# Patient Record
Sex: MALE | Race: WHITE | NOT HISPANIC OR LATINO
[De-identification: names, ages, dates, MRNs, and addresses within clinical notes are randomized per-mention and may not be internally consistent; named-entity substitution may affect disease eponyms.]

---

## 2017-01-11 ENCOUNTER — APPOINTMENT (OUTPATIENT)
Dept: CARDIOLOGY | Facility: CLINIC | Age: 72
End: 2017-01-11

## 2017-01-11 VITALS
HEIGHT: 70 IN | HEART RATE: 64 BPM | SYSTOLIC BLOOD PRESSURE: 138 MMHG | WEIGHT: 238 LBS | BODY MASS INDEX: 34.07 KG/M2 | DIASTOLIC BLOOD PRESSURE: 74 MMHG

## 2017-01-11 DIAGNOSIS — Z87.891 PERSONAL HISTORY OF NICOTINE DEPENDENCE: ICD-10-CM

## 2017-01-23 ENCOUNTER — INPATIENT (INPATIENT)
Facility: HOSPITAL | Age: 72
LOS: 5 days | Discharge: HOME | End: 2017-01-29
Attending: INTERNAL MEDICINE

## 2017-05-15 ENCOUNTER — APPOINTMENT (OUTPATIENT)
Dept: CARDIOLOGY | Facility: CLINIC | Age: 72
End: 2017-05-15

## 2017-05-15 VITALS
WEIGHT: 224 LBS | BODY MASS INDEX: 32.07 KG/M2 | HEART RATE: 57 BPM | DIASTOLIC BLOOD PRESSURE: 80 MMHG | SYSTOLIC BLOOD PRESSURE: 124 MMHG | HEIGHT: 70 IN

## 2017-06-27 DIAGNOSIS — K26.9 DUODENAL ULCER, UNSPECIFIED AS ACUTE OR CHRONIC, WITHOUT HEMORRHAGE OR PERFORATION: ICD-10-CM

## 2017-06-27 DIAGNOSIS — Z79.84 LONG TERM (CURRENT) USE OF ORAL HYPOGLYCEMIC DRUGS: ICD-10-CM

## 2017-06-27 DIAGNOSIS — E11.9 TYPE 2 DIABETES MELLITUS WITHOUT COMPLICATIONS: ICD-10-CM

## 2017-06-27 DIAGNOSIS — Z79.82 LONG TERM (CURRENT) USE OF ASPIRIN: ICD-10-CM

## 2017-06-27 DIAGNOSIS — E78.5 HYPERLIPIDEMIA, UNSPECIFIED: ICD-10-CM

## 2017-06-27 DIAGNOSIS — K57.10 DIVERTICULOSIS OF SMALL INTESTINE WITHOUT PERFORATION OR ABSCESS WITHOUT BLEEDING: ICD-10-CM

## 2017-06-27 DIAGNOSIS — I25.10 ATHEROSCLEROTIC HEART DISEASE OF NATIVE CORONARY ARTERY WITHOUT ANGINA PECTORIS: ICD-10-CM

## 2017-06-27 DIAGNOSIS — I10 ESSENTIAL (PRIMARY) HYPERTENSION: ICD-10-CM

## 2017-06-27 DIAGNOSIS — R17 UNSPECIFIED JAUNDICE: ICD-10-CM

## 2017-06-27 DIAGNOSIS — R74.0 NONSPECIFIC ELEVATION OF LEVELS OF TRANSAMINASE AND LACTIC ACID DEHYDROGENASE [LDH]: ICD-10-CM

## 2017-06-27 DIAGNOSIS — Z82.49 FAMILY HISTORY OF ISCHEMIC HEART DISEASE AND OTHER DISEASES OF THE CIRCULATORY SYSTEM: ICD-10-CM

## 2017-06-27 DIAGNOSIS — Z87.891 PERSONAL HISTORY OF NICOTINE DEPENDENCE: ICD-10-CM

## 2017-06-27 DIAGNOSIS — Z95.5 PRESENCE OF CORONARY ANGIOPLASTY IMPLANT AND GRAFT: ICD-10-CM

## 2017-08-25 ENCOUNTER — INPATIENT (INPATIENT)
Facility: HOSPITAL | Age: 72
LOS: 4 days | Discharge: HOME | End: 2017-08-30
Attending: INTERNAL MEDICINE

## 2017-08-25 DIAGNOSIS — R10.9 UNSPECIFIED ABDOMINAL PAIN: ICD-10-CM

## 2017-08-25 DIAGNOSIS — K26.3 ACUTE DUODENAL ULCER WITHOUT HEMORRHAGE OR PERFORATION: ICD-10-CM

## 2017-09-01 DIAGNOSIS — Z79.84 LONG TERM (CURRENT) USE OF ORAL HYPOGLYCEMIC DRUGS: ICD-10-CM

## 2017-09-01 DIAGNOSIS — Z95.5 PRESENCE OF CORONARY ANGIOPLASTY IMPLANT AND GRAFT: ICD-10-CM

## 2017-09-01 DIAGNOSIS — K80.51 CALCULUS OF BILE DUCT WITHOUT CHOLANGITIS OR CHOLECYSTITIS WITH OBSTRUCTION: ICD-10-CM

## 2017-09-01 DIAGNOSIS — K57.10 DIVERTICULOSIS OF SMALL INTESTINE WITHOUT PERFORATION OR ABSCESS WITHOUT BLEEDING: ICD-10-CM

## 2017-09-01 DIAGNOSIS — E11.9 TYPE 2 DIABETES MELLITUS WITHOUT COMPLICATIONS: ICD-10-CM

## 2017-09-01 DIAGNOSIS — R17 UNSPECIFIED JAUNDICE: ICD-10-CM

## 2017-09-01 DIAGNOSIS — I10 ESSENTIAL (PRIMARY) HYPERTENSION: ICD-10-CM

## 2017-09-01 DIAGNOSIS — I25.10 ATHEROSCLEROTIC HEART DISEASE OF NATIVE CORONARY ARTERY WITHOUT ANGINA PECTORIS: ICD-10-CM

## 2017-09-01 DIAGNOSIS — D64.9 ANEMIA, UNSPECIFIED: ICD-10-CM

## 2017-09-06 ENCOUNTER — APPOINTMENT (OUTPATIENT)
Dept: SURGERY | Facility: CLINIC | Age: 72
End: 2017-09-06
Payer: MEDICARE

## 2017-09-06 VITALS
SYSTOLIC BLOOD PRESSURE: 120 MMHG | DIASTOLIC BLOOD PRESSURE: 70 MMHG | BODY MASS INDEX: 31.21 KG/M2 | WEIGHT: 218 LBS | HEIGHT: 70 IN

## 2017-09-06 DIAGNOSIS — K80.40 CALCULUS OF BILE DUCT WITH CHOLECYSTITIS, UNSPECIFIED, W/OUT OBSTRUCTION: ICD-10-CM

## 2017-09-06 PROCEDURE — 99213 OFFICE O/P EST LOW 20 MIN: CPT

## 2017-09-13 ENCOUNTER — MOBILE ON CALL (OUTPATIENT)
Age: 72
End: 2017-09-13

## 2017-09-18 ENCOUNTER — OUTPATIENT (OUTPATIENT)
Dept: OUTPATIENT SERVICES | Facility: HOSPITAL | Age: 72
LOS: 1 days | Discharge: HOME | End: 2017-09-18

## 2017-09-18 DIAGNOSIS — K80.40 CALCULUS OF BILE DUCT WITH CHOLECYSTITIS, UNSPECIFIED, WITHOUT OBSTRUCTION: ICD-10-CM

## 2017-09-18 DIAGNOSIS — Z01.818 ENCOUNTER FOR OTHER PREPROCEDURAL EXAMINATION: ICD-10-CM

## 2017-09-18 DIAGNOSIS — E66.9 OBESITY, UNSPECIFIED: ICD-10-CM

## 2017-09-18 DIAGNOSIS — K26.3 ACUTE DUODENAL ULCER WITHOUT HEMORRHAGE OR PERFORATION: ICD-10-CM

## 2017-09-18 DIAGNOSIS — E11.9 TYPE 2 DIABETES MELLITUS WITHOUT COMPLICATIONS: ICD-10-CM

## 2017-09-18 DIAGNOSIS — I10 ESSENTIAL (PRIMARY) HYPERTENSION: ICD-10-CM

## 2017-09-18 DIAGNOSIS — I25.10 ATHEROSCLEROTIC HEART DISEASE OF NATIVE CORONARY ARTERY WITHOUT ANGINA PECTORIS: ICD-10-CM

## 2017-09-18 DIAGNOSIS — R10.9 UNSPECIFIED ABDOMINAL PAIN: ICD-10-CM

## 2017-09-21 ENCOUNTER — APPOINTMENT (OUTPATIENT)
Dept: CARDIOLOGY | Facility: CLINIC | Age: 72
End: 2017-09-21

## 2017-09-21 VITALS
HEIGHT: 70 IN | WEIGHT: 213 LBS | SYSTOLIC BLOOD PRESSURE: 106 MMHG | DIASTOLIC BLOOD PRESSURE: 74 MMHG | BODY MASS INDEX: 30.49 KG/M2

## 2017-09-22 ENCOUNTER — OUTPATIENT (OUTPATIENT)
Dept: OUTPATIENT SERVICES | Facility: HOSPITAL | Age: 72
LOS: 1 days | Discharge: HOME | End: 2017-09-22

## 2017-09-22 DIAGNOSIS — R10.9 UNSPECIFIED ABDOMINAL PAIN: ICD-10-CM

## 2017-09-22 DIAGNOSIS — K26.3 ACUTE DUODENAL ULCER WITHOUT HEMORRHAGE OR PERFORATION: ICD-10-CM

## 2017-09-23 ENCOUNTER — MOBILE ON CALL (OUTPATIENT)
Age: 72
End: 2017-09-23

## 2017-09-26 ENCOUNTER — TRANSCRIPTION ENCOUNTER (OUTPATIENT)
Age: 72
End: 2017-09-26

## 2017-10-02 DIAGNOSIS — I10 ESSENTIAL (PRIMARY) HYPERTENSION: ICD-10-CM

## 2017-10-02 DIAGNOSIS — Z87.891 PERSONAL HISTORY OF NICOTINE DEPENDENCE: ICD-10-CM

## 2017-10-02 DIAGNOSIS — K80.20 CALCULUS OF GALLBLADDER WITHOUT CHOLECYSTITIS WITHOUT OBSTRUCTION: ICD-10-CM

## 2017-10-02 DIAGNOSIS — E78.00 PURE HYPERCHOLESTEROLEMIA, UNSPECIFIED: ICD-10-CM

## 2017-10-02 DIAGNOSIS — E11.9 TYPE 2 DIABETES MELLITUS WITHOUT COMPLICATIONS: ICD-10-CM

## 2017-10-02 DIAGNOSIS — K80.10 CALCULUS OF GALLBLADDER WITH CHRONIC CHOLECYSTITIS WITHOUT OBSTRUCTION: ICD-10-CM

## 2017-10-02 DIAGNOSIS — K82.8 OTHER SPECIFIED DISEASES OF GALLBLADDER: ICD-10-CM

## 2017-10-04 ENCOUNTER — APPOINTMENT (OUTPATIENT)
Dept: SURGERY | Facility: CLINIC | Age: 72
End: 2017-10-04
Payer: MEDICARE

## 2017-10-04 VITALS
WEIGHT: 216 LBS | HEIGHT: 70 IN | DIASTOLIC BLOOD PRESSURE: 76 MMHG | SYSTOLIC BLOOD PRESSURE: 140 MMHG | BODY MASS INDEX: 30.92 KG/M2

## 2017-10-04 PROCEDURE — 99024 POSTOP FOLLOW-UP VISIT: CPT

## 2017-11-01 ENCOUNTER — APPOINTMENT (OUTPATIENT)
Dept: SURGERY | Facility: CLINIC | Age: 72
End: 2017-11-01
Payer: MEDICARE

## 2017-11-01 VITALS
SYSTOLIC BLOOD PRESSURE: 132 MMHG | WEIGHT: 220 LBS | BODY MASS INDEX: 31.5 KG/M2 | DIASTOLIC BLOOD PRESSURE: 78 MMHG | HEIGHT: 70 IN

## 2017-11-01 PROCEDURE — 99024 POSTOP FOLLOW-UP VISIT: CPT

## 2018-04-05 ENCOUNTER — APPOINTMENT (OUTPATIENT)
Dept: CARDIOLOGY | Facility: CLINIC | Age: 73
End: 2018-04-05

## 2018-04-05 VITALS
WEIGHT: 239 LBS | BODY MASS INDEX: 34.22 KG/M2 | HEIGHT: 70 IN | HEART RATE: 69 BPM | SYSTOLIC BLOOD PRESSURE: 132 MMHG | DIASTOLIC BLOOD PRESSURE: 78 MMHG

## 2018-10-10 ENCOUNTER — APPOINTMENT (OUTPATIENT)
Dept: CARDIOLOGY | Facility: CLINIC | Age: 73
End: 2018-10-10

## 2018-10-10 VITALS — HEIGHT: 70 IN | BODY MASS INDEX: 32.93 KG/M2 | WEIGHT: 230 LBS

## 2018-10-10 VITALS — SYSTOLIC BLOOD PRESSURE: 120 MMHG | DIASTOLIC BLOOD PRESSURE: 72 MMHG

## 2018-10-10 VITALS — HEART RATE: 65 BPM

## 2018-10-29 ENCOUNTER — FORM ENCOUNTER (OUTPATIENT)
Age: 73
End: 2018-10-29

## 2018-10-30 ENCOUNTER — OUTPATIENT (OUTPATIENT)
Dept: OUTPATIENT SERVICES | Facility: HOSPITAL | Age: 73
LOS: 1 days | Discharge: HOME | End: 2018-10-30

## 2018-10-30 VITALS
HEART RATE: 64 BPM | WEIGHT: 229.28 LBS | RESPIRATION RATE: 18 BRPM | DIASTOLIC BLOOD PRESSURE: 79 MMHG | HEIGHT: 70 IN | OXYGEN SATURATION: 97 % | TEMPERATURE: 98 F | SYSTOLIC BLOOD PRESSURE: 127 MMHG

## 2018-10-30 DIAGNOSIS — I25.10 ATHEROSCLEROTIC HEART DISEASE OF NATIVE CORONARY ARTERY WITHOUT ANGINA PECTORIS: Chronic | ICD-10-CM

## 2018-10-30 DIAGNOSIS — S42.92XS FRACTURE OF LEFT SHOULDER GIRDLE, PART UNSPECIFIED, SEQUELA: Chronic | ICD-10-CM

## 2018-10-30 DIAGNOSIS — Z01.818 ENCOUNTER FOR OTHER PREPROCEDURAL EXAMINATION: ICD-10-CM

## 2018-10-30 DIAGNOSIS — Z41.9 ENCOUNTER FOR PROCEDURE FOR PURPOSES OTHER THAN REMEDYING HEALTH STATE, UNSPECIFIED: Chronic | ICD-10-CM

## 2018-10-30 DIAGNOSIS — I25.10 ATHEROSCLEROTIC HEART DISEASE OF NATIVE CORONARY ARTERY WITHOUT ANGINA PECTORIS: ICD-10-CM

## 2018-10-30 LAB
ALBUMIN SERPL ELPH-MCNC: 4.4 G/DL — SIGNIFICANT CHANGE UP (ref 3.5–5.2)
ALP SERPL-CCNC: 88 U/L — SIGNIFICANT CHANGE UP (ref 30–115)
ALT FLD-CCNC: 15 U/L — SIGNIFICANT CHANGE UP (ref 0–41)
ANION GAP SERPL CALC-SCNC: 15 MMOL/L — HIGH (ref 7–14)
APTT BLD: 34.3 SEC — SIGNIFICANT CHANGE UP (ref 27–39.2)
AST SERPL-CCNC: 16 U/L — SIGNIFICANT CHANGE UP (ref 0–41)
BASOPHILS # BLD AUTO: 0.09 K/UL — SIGNIFICANT CHANGE UP (ref 0–0.2)
BASOPHILS NFR BLD AUTO: 1.1 % — HIGH (ref 0–1)
BILIRUB SERPL-MCNC: 1.2 MG/DL — SIGNIFICANT CHANGE UP (ref 0.2–1.2)
BUN SERPL-MCNC: 17 MG/DL — SIGNIFICANT CHANGE UP (ref 10–20)
CALCIUM SERPL-MCNC: 9.4 MG/DL — SIGNIFICANT CHANGE UP (ref 8.5–10.1)
CHLORIDE SERPL-SCNC: 98 MMOL/L — SIGNIFICANT CHANGE UP (ref 98–110)
CO2 SERPL-SCNC: 25 MMOL/L — SIGNIFICANT CHANGE UP (ref 17–32)
CREAT SERPL-MCNC: 1 MG/DL — SIGNIFICANT CHANGE UP (ref 0.7–1.5)
EOSINOPHIL # BLD AUTO: 0.42 K/UL — SIGNIFICANT CHANGE UP (ref 0–0.7)
EOSINOPHIL NFR BLD AUTO: 5.1 % — SIGNIFICANT CHANGE UP (ref 0–8)
GLUCOSE SERPL-MCNC: 109 MG/DL — HIGH (ref 70–99)
HCT VFR BLD CALC: 43.2 % — SIGNIFICANT CHANGE UP (ref 42–52)
HGB BLD-MCNC: 14.7 G/DL — SIGNIFICANT CHANGE UP (ref 14–18)
IMM GRANULOCYTES NFR BLD AUTO: 0.2 % — SIGNIFICANT CHANGE UP (ref 0.1–0.3)
INR BLD: 1.18 RATIO — SIGNIFICANT CHANGE UP (ref 0.65–1.3)
LYMPHOCYTES # BLD AUTO: 1.04 K/UL — LOW (ref 1.2–3.4)
LYMPHOCYTES # BLD AUTO: 12.6 % — LOW (ref 20.5–51.1)
MCHC RBC-ENTMCNC: 29.9 PG — SIGNIFICANT CHANGE UP (ref 27–31)
MCHC RBC-ENTMCNC: 34 G/DL — SIGNIFICANT CHANGE UP (ref 32–37)
MCV RBC AUTO: 87.8 FL — SIGNIFICANT CHANGE UP (ref 80–94)
MONOCYTES # BLD AUTO: 0.63 K/UL — HIGH (ref 0.1–0.6)
MONOCYTES NFR BLD AUTO: 7.6 % — SIGNIFICANT CHANGE UP (ref 1.7–9.3)
NEUTROPHILS # BLD AUTO: 6.07 K/UL — SIGNIFICANT CHANGE UP (ref 1.4–6.5)
NEUTROPHILS NFR BLD AUTO: 73.4 % — SIGNIFICANT CHANGE UP (ref 42.2–75.2)
NRBC # BLD: 0 /100 WBCS — SIGNIFICANT CHANGE UP (ref 0–0)
PLATELET # BLD AUTO: 245 K/UL — SIGNIFICANT CHANGE UP (ref 130–400)
POTASSIUM SERPL-MCNC: 4.6 MMOL/L — SIGNIFICANT CHANGE UP (ref 3.5–5)
POTASSIUM SERPL-SCNC: 4.6 MMOL/L — SIGNIFICANT CHANGE UP (ref 3.5–5)
PROT SERPL-MCNC: 7.8 G/DL — SIGNIFICANT CHANGE UP (ref 6–8)
PROTHROM AB SERPL-ACNC: 13.6 SEC — HIGH (ref 9.95–12.87)
RBC # BLD: 4.92 M/UL — SIGNIFICANT CHANGE UP (ref 4.7–6.1)
RBC # FLD: 12.4 % — SIGNIFICANT CHANGE UP (ref 11.5–14.5)
SODIUM SERPL-SCNC: 138 MMOL/L — SIGNIFICANT CHANGE UP (ref 135–146)
WBC # BLD: 8.27 K/UL — SIGNIFICANT CHANGE UP (ref 4.8–10.8)
WBC # FLD AUTO: 8.27 K/UL — SIGNIFICANT CHANGE UP (ref 4.8–10.8)

## 2018-10-30 NOTE — H&P PST ADULT - HISTORY OF PRESENT ILLNESS
Patient c/o chest pressure and ALFARO with out any associated symptoms x 3 wks. Patient denies any palpitations fever, cough or dysuria. Ex tolerance of 1  1/2 FOS with out SOB. No TRISTA.

## 2018-10-30 NOTE — H&P PST ADULT - REASON FOR ADMISSION
72 yo m presents to PAST for left heart catheretization under sedation on 11/6/2018 at cath lab by DR. Lee.

## 2018-10-30 NOTE — H&P PST ADULT - PMH
CAD (coronary artery disease)  PCI with 2 stents  DM (diabetes mellitus)    HTN (hypertension)    Hypercholesteremia

## 2018-10-30 NOTE — H&P PST ADULT - TEACHING/LEARNING FACTORS INFLUENCE READINESS TO LEARN
Pt given discharge instructions with understanding. Pt ambulatory out of ED steady gait in no distress    
none

## 2018-10-30 NOTE — H&P PST ADULT - PSH
Closed fracture of left shoulder, sequela  Dislocation  Coronary arteriosclerosis after percutaneous transluminal coronary angioplasty (PTCA)  2 Stents 07/1/2014  Elective surgery  Cholecystectomy 1 yr ago

## 2018-10-30 NOTE — H&P PST ADULT - NSANTHOSAYNRD_GEN_A_CORE
No. TRISTA screening performed.  STOP BANG Legend: 0-2 = LOW Risk; 3-4 = INTERMEDIATE Risk; 5-8 = HIGH Risk

## 2018-10-31 PROBLEM — I25.10 ATHEROSCLEROTIC HEART DISEASE OF NATIVE CORONARY ARTERY WITHOUT ANGINA PECTORIS: Chronic | Status: ACTIVE | Noted: 2018-10-30

## 2018-11-06 ENCOUNTER — OUTPATIENT (OUTPATIENT)
Dept: OUTPATIENT SERVICES | Facility: HOSPITAL | Age: 73
LOS: 1 days | Discharge: HOME | End: 2018-11-06

## 2018-11-06 DIAGNOSIS — S42.92XS FRACTURE OF LEFT SHOULDER GIRDLE, PART UNSPECIFIED, SEQUELA: Chronic | ICD-10-CM

## 2018-11-06 DIAGNOSIS — Z41.9 ENCOUNTER FOR PROCEDURE FOR PURPOSES OTHER THAN REMEDYING HEALTH STATE, UNSPECIFIED: Chronic | ICD-10-CM

## 2018-11-06 DIAGNOSIS — I25.10 ATHEROSCLEROTIC HEART DISEASE OF NATIVE CORONARY ARTERY WITHOUT ANGINA PECTORIS: Chronic | ICD-10-CM

## 2018-11-06 LAB — GLUCOSE BLDC GLUCOMTR-MCNC: 144 MG/DL — HIGH (ref 70–99)

## 2018-11-06 NOTE — PROGRESS NOTE ADULT - SUBJECTIVE AND OBJECTIVE BOX
POST OPERATIVE PROCEDURAL DOCUMENTATION   Preliminary Cardiac Catherization Post-Procedure Report:    PRE-OP DIAGNOSIS: cad, s/p pci of lcx, angina    PROCEDURE:     Left Heart Catheterization     LV Angiogram     Selective Coronary Angiogram    Primary Physician:  Adrián Jimenez M.D.  Cardiac Fellow:  Dr. Chu    ANESTHESIA TYPE:  local    ESTIMATED BLOOD LOSS:  Less than 10 cc    CONDITION: Good    SPECIMENS REMOVED (IF APPLICABLE): Not Applicable    IMPLANTS (IF APPLICABLE): NA    DESCRIPTION OF PROCEDURRE:  The right and left femoral groins, as well as the right radial artery were prepped and draped in the usual sterile fashion. Following local instillation of lidocane, a 6Fr introducer was inserted percutaneously into the RIGHT ULNAR ARTERY  using the seldinger technique. Following this, multiple guidewires and pre shaped catheters were used to adequately opacifiy the coronary arteries and perform left heart catheterization as well as an LV angiogram, using the Judkin's approach. Left heart catheterization and left ventricular angiogram was performed in the CHU projection. Following this, selective coronary angiography was performed in multiple obliquities. At the end of the procedure, all guidewires and catheters were removed. Homostasis was obtained by manual compression. There were no complications.                                 FINDINGS OF PROCEDURE:  1. Left Heart Catheterization: LVEDP: NORMAL                                                       LV Pressure: 110/10                                                       CA Pressure:  110/80                 2. LV Angiogram:  The LV is normal in size.                                    Overall, there is normal LV systolic function.                                   No significant segmental wall contraction abnormalities seen.                                   The EF is approx. 60%                                   No mitral valve prolapse, mitral insufficiency or mitral annular calcification seen.   3. SELECTIVE CORONARY ANGIOGRAM:                           LEFT MAIN: Normal                           LAD: Irregular                           DIAGONAL: Normal                           LCX: Normal, patent stent                           OBTUSE MARGINAL: Normal                           RCA: luminal irregularity                           PDA: Normal                           PLV: Normal  4. The coronary circulation was right dominant.      Post Procedure Diagnosis/Impression: CAD, patent LCX STENTN angina                         Complications:  None    PLAN OF CARE:  D/C Home today                              Continue DAPT, B-blocker, Statin therapy and ace inhibitor                              Continue medical therapy                              Office visit in 2-3 weeks
PRE-OPERATIVE DAY OF PROCEDURE EVALUATION NOTE:  I have personally seen and examined the patient. I agree with the history and physicial which I have reviewed and noted any changes below. signed electronically by Dr Adrián Jimenez 11-06-18 @ 07:28

## 2018-11-12 DIAGNOSIS — I20.8 OTHER FORMS OF ANGINA PECTORIS: ICD-10-CM

## 2018-11-12 DIAGNOSIS — E78.00 PURE HYPERCHOLESTEROLEMIA, UNSPECIFIED: ICD-10-CM

## 2018-11-12 DIAGNOSIS — E11.9 TYPE 2 DIABETES MELLITUS WITHOUT COMPLICATIONS: ICD-10-CM

## 2019-05-08 PROBLEM — E11.9 TYPE 2 DIABETES MELLITUS WITHOUT COMPLICATIONS: Chronic | Status: ACTIVE | Noted: 2018-10-30

## 2019-05-08 PROBLEM — I10 ESSENTIAL (PRIMARY) HYPERTENSION: Chronic | Status: ACTIVE | Noted: 2018-10-30

## 2019-05-08 PROBLEM — E78.00 PURE HYPERCHOLESTEROLEMIA, UNSPECIFIED: Chronic | Status: ACTIVE | Noted: 2018-10-30

## 2019-07-11 ENCOUNTER — TRANSCRIPTION ENCOUNTER (OUTPATIENT)
Age: 74
End: 2019-07-11

## 2019-07-15 ENCOUNTER — APPOINTMENT (OUTPATIENT)
Dept: CARDIOLOGY | Facility: CLINIC | Age: 74
End: 2019-07-15

## 2019-07-17 ENCOUNTER — APPOINTMENT (OUTPATIENT)
Dept: CARDIOLOGY | Facility: CLINIC | Age: 74
End: 2019-07-17
Payer: MEDICARE

## 2019-07-17 VITALS
BODY MASS INDEX: 32.93 KG/M2 | DIASTOLIC BLOOD PRESSURE: 78 MMHG | SYSTOLIC BLOOD PRESSURE: 136 MMHG | HEART RATE: 68 BPM | HEIGHT: 70 IN | WEIGHT: 230 LBS

## 2019-07-17 PROCEDURE — 99214 OFFICE O/P EST MOD 30 MIN: CPT

## 2019-07-17 PROCEDURE — 93000 ELECTROCARDIOGRAM COMPLETE: CPT

## 2019-07-17 NOTE — ASSESSMENT
[FreeTextEntry1] : Coronary artery disease\par STATUS POST CORONARY ANGIOPLASTY OF LEFT CIRCUMFLEX \par CHEST DISCOMFORT-CLASS 1\par No symptoms of congestive heart failure\par Hypertension\par HYPERLIPIDEMIA

## 2019-07-17 NOTE — REASON FOR VISIT
[Follow-Up - Clinic] : a clinic follow-up of [Chest Pain] : chest pain [Coronary Artery Disease] : coronary artery disease [Hypertension] : hypertension [FreeTextEntry2] : cad, s/p pci and pre-op clearance

## 2019-07-17 NOTE — DISCUSSION/SUMMARY
[FreeTextEntry1] : the patient is cleared for surgery.\par this is low risk surgery\par no further cardiac testing is necessary.

## 2019-07-17 NOTE — PHYSICAL EXAM
[General Appearance - Well Developed] : well developed [Normal Appearance] : normal appearance [Well Groomed] : well groomed [General Appearance - Well Nourished] : well nourished [No Deformities] : no deformities [General Appearance - In No Acute Distress] : no acute distress [Normal Conjunctiva] : the conjunctiva exhibited no abnormalities [Eyelids - No Xanthelasma] : the eyelids demonstrated no xanthelasmas [Normal Oral Mucosa] : normal oral mucosa [No Oral Pallor] : no oral pallor [No Oral Cyanosis] : no oral cyanosis [Normal Jugular Venous A Waves Present] : normal jugular venous A waves present [Normal Jugular Venous V Waves Present] : normal jugular venous V waves present [No Jugular Venous Skelton A Waves] : no jugular venous skelton A waves [Respiration, Rhythm And Depth] : normal respiratory rhythm and effort [Exaggerated Use Of Accessory Muscles For Inspiration] : no accessory muscle use [Auscultation Breath Sounds / Voice Sounds] : lungs were clear to auscultation bilaterally [Heart Rate And Rhythm] : heart rate and rhythm were normal [Heart Sounds] : normal S1 and S2 [Murmurs] : no murmurs present [Abdomen Soft] : soft [Abdomen Tenderness] : non-tender [Abdomen Mass (___ Cm)] : no abdominal mass palpated [Abnormal Walk] : normal gait [Gait - Sufficient For Exercise Testing] : the gait was sufficient for exercise testing [Nail Clubbing] : no clubbing of the fingernails [Cyanosis, Localized] : no localized cyanosis [Petechial Hemorrhages (___cm)] : no petechial hemorrhages [Skin Color & Pigmentation] : normal skin color and pigmentation [] : no rash [No Venous Stasis] : no venous stasis [Skin Lesions] : no skin lesions [No Skin Ulcers] : no skin ulcer [No Xanthoma] : no  xanthoma was observed [Oriented To Time, Place, And Person] : oriented to person, place, and time [Affect] : the affect was normal [Mood] : the mood was normal [No Anxiety] : not feeling anxious

## 2019-07-17 NOTE — HISTORY OF PRESENT ILLNESS
[FreeTextEntry1] : This is a 71-year-old, white male, a patient of Dr. Cueva, who recently  retired, who desires I continue his medical care.\par The patient has a history of coronary artery disease. He has a history of  angioplasty, with stents placed in the proximal and distal portion of his left circumflex system. \par This was done in July of the year 2014.\par He presently is on aggressive medical therapy. \par He does not complain of angina pectoris. He has no symptoms of congestive heart failure.\par He denies presyncope, syncope or palpitations.\par His blood pressure is well controlled.\par His cholesterol is acceptable.\par \par  His last echocardiogram was in 2016. There was mild to moderate tricuspid regurgitation mild to moderate mitral regurgitation.  Overall ventricular ejection fraction was normal.\par \par nuclear exam in 2016 was normal. \par \par the patient is here for pre-op clearance.

## 2019-10-30 ENCOUNTER — APPOINTMENT (OUTPATIENT)
Dept: CARDIOLOGY | Facility: CLINIC | Age: 74
End: 2019-10-30
Payer: MEDICARE

## 2019-10-30 VITALS
HEIGHT: 70 IN | HEART RATE: 70 BPM | BODY MASS INDEX: 32.93 KG/M2 | SYSTOLIC BLOOD PRESSURE: 122 MMHG | WEIGHT: 230 LBS | DIASTOLIC BLOOD PRESSURE: 80 MMHG

## 2019-10-30 PROCEDURE — 93000 ELECTROCARDIOGRAM COMPLETE: CPT

## 2019-10-30 PROCEDURE — 99214 OFFICE O/P EST MOD 30 MIN: CPT

## 2019-10-30 NOTE — PHYSICAL EXAM
[General Appearance - Well Developed] : well developed [Normal Appearance] : normal appearance [Well Groomed] : well groomed [General Appearance - Well Nourished] : well nourished [No Deformities] : no deformities [General Appearance - In No Acute Distress] : no acute distress [Normal Conjunctiva] : the conjunctiva exhibited no abnormalities [Eyelids - No Xanthelasma] : the eyelids demonstrated no xanthelasmas [Normal Oral Mucosa] : normal oral mucosa [No Oral Pallor] : no oral pallor [No Oral Cyanosis] : no oral cyanosis [Normal Jugular Venous A Waves Present] : normal jugular venous A waves present [Normal Jugular Venous V Waves Present] : normal jugular venous V waves present [No Jugular Venous Skelton A Waves] : no jugular venous skelton A waves [Heart Rate And Rhythm] : heart rate and rhythm were normal [Heart Sounds] : normal S1 and S2 [Murmurs] : no murmurs present [Respiration, Rhythm And Depth] : normal respiratory rhythm and effort [Exaggerated Use Of Accessory Muscles For Inspiration] : no accessory muscle use [Auscultation Breath Sounds / Voice Sounds] : lungs were clear to auscultation bilaterally [Abdomen Soft] : soft [Abdomen Tenderness] : non-tender [Abdomen Mass (___ Cm)] : no abdominal mass palpated [Abnormal Walk] : normal gait [Gait - Sufficient For Exercise Testing] : the gait was sufficient for exercise testing [Nail Clubbing] : no clubbing of the fingernails [Cyanosis, Localized] : no localized cyanosis [Petechial Hemorrhages (___cm)] : no petechial hemorrhages [Skin Color & Pigmentation] : normal skin color and pigmentation [] : no rash [No Venous Stasis] : no venous stasis [Skin Lesions] : no skin lesions [No Skin Ulcers] : no skin ulcer [No Xanthoma] : no  xanthoma was observed [Oriented To Time, Place, And Person] : oriented to person, place, and time [Affect] : the affect was normal [Mood] : the mood was normal [No Anxiety] : not feeling anxious

## 2019-10-30 NOTE — REASON FOR VISIT
[Follow-Up - Clinic] : a clinic follow-up of [Chest Pain] : chest pain [Coronary Artery Disease] : coronary artery disease [Hypertension] : hypertension [FreeTextEntry2] : cad, s/p pci and pre-op clearance for cataract surgery

## 2019-10-30 NOTE — DISCUSSION/SUMMARY
[FreeTextEntry1] : patient cleared for cataract surgery\par low risk surgery\par no further cardiac work up needed\par rv 6 mos

## 2019-10-30 NOTE — ASSESSMENT
[FreeTextEntry1] : Coronary artery disease\par STATUS POST CORONARY ANGIOPLASTY OF LEFT CIRCUMFLEX \par CHEST DISCOMFORT-CLASS 1\par No symptoms of congestive heart failure\par Hypertension\par HYPERLIPIDEMIA\par no cad by cath 11/2018

## 2019-10-30 NOTE — HISTORY OF PRESENT ILLNESS
[FreeTextEntry1] : This is a 74 year-old, white male, a patient of Dr. Cueva, who recently  retired, who desires I continue his medical care.\par The patient has a history of coronary artery disease. \par He has a history of  angioplasty, with stents placed in the proximal and distal portion of his left circumflex system. \par This was done in July of the year 2014.\par He presently is on aggressive medical therapy. \par He does not complain of angina pectoris. He has no symptoms of congestive heart failure.\par He denies presyncope, syncope or palpitations.\par His blood pressure is well controlled.\par His cholesterol is acceptable.\par \par  His last echocardiogram was in 2016. \par There was mild to moderate tricuspid regurgitation mild to moderate mitral regurgitation.  Overall ventricular ejection fraction was normal.\par \par nuclear exam in 2016 was normal. \par \par cath in 11/2018 revealed no signiicant cad and normal function\par class 1\par \par presently offers no new cardiac complaints

## 2020-05-11 ENCOUNTER — APPOINTMENT (OUTPATIENT)
Dept: PLASTIC SURGERY | Facility: CLINIC | Age: 75
End: 2020-05-11
Payer: MEDICARE

## 2020-05-11 VITALS — WEIGHT: 240 LBS | HEIGHT: 70 IN | BODY MASS INDEX: 34.36 KG/M2

## 2020-05-11 PROCEDURE — 20550 NJX 1 TENDON SHEATH/LIGAMENT: CPT

## 2020-05-11 PROCEDURE — 99202 OFFICE O/P NEW SF 15 MIN: CPT | Mod: 25

## 2020-05-11 NOTE — PROCEDURE
[FreeTextEntry1] : trigger thumb and ring finger [FreeTextEntry2] : kenalog injection of trigger thumb and ring finger [FreeTextEntry6] : The benefits, risks, and outcomes of kenalog injection were discussed. The risks include infection, hypopigmentation, poor wound healing, fat atrophy, hyperglycemia and dissatisfaction with outcome. The patient understood the risks and elected to proceed with steroid injection.\par \par The right thumb and ring finger was prepared in sterile fashion. \par The injection site was identified and marked. \par Triamcinolone 5 mg was injected into the flexor tendon sheath without issue of the thumb and ring finger\par The patient tolerated the procedure.\par \par total 10 mg\par

## 2020-05-11 NOTE — HISTORY OF PRESENT ILLNESS
[FreeTextEntry1] : 74 yo M with PMH of HTN, CAD s/p cardiac stents x2, DM II and HLD who is RHD and presents today for evaluation of right trigger thumb and 4th finger for the past 5-6 months. Reports audible click at times and inability to extend right thumb fully with significant pain. Right 4th digit is triggering intermittently with less discomfort. Denies any hand trauma or prior steroid injections to either finger. \par \par Occupation- retired  \par Former smoker

## 2020-05-11 NOTE — ASSESSMENT
[FreeTextEntry1] : 74 yo RHD diabetic M with new right thumb and right 4th trigger finger. No prior steroid injections. \par \par s/p kenalog injection\par \par - recommend Kenalog injection \par - benefits, risks and alternatives were reviewed\par - tx options: observation (no improvement) vs kenalog injection (conservative, nonsurgical) vs A1 pulley release (surgery)\par - pt elected to perform kenalog injection\par - reviewed possible need for 2nd injection, if no improvement\par - c/w strict DM control\par - all questions were answered\par - follow up in 6 weeks

## 2020-05-11 NOTE — PHYSICAL EXAM
[de-identified] : well-developed male, NAD [de-identified] : NC/AT [de-identified] : PERRL [de-identified] : supple [de-identified] : good inspiratory effort [de-identified] : KATIANAR [de-identified] : softly protuberant, nontender  [de-identified] : Right thumb with limited flexion, tenderness to palpation over A1 pulley & 4th digit with + triggering and tender over A1 pulley, otherwise FROM

## 2020-06-22 ENCOUNTER — APPOINTMENT (OUTPATIENT)
Dept: PLASTIC SURGERY | Facility: CLINIC | Age: 75
End: 2020-06-22
Payer: MEDICARE

## 2020-06-22 PROCEDURE — 99213 OFFICE O/P EST LOW 20 MIN: CPT | Mod: 25

## 2020-06-22 PROCEDURE — 20550 NJX 1 TENDON SHEATH/LIGAMENT: CPT

## 2020-06-22 NOTE — PROCEDURE
[Nl] : None [FreeTextEntry2] : kenalog injection of trigger thumb and ring finger [FreeTextEntry1] : right trigger thumb and ring finger [FreeTextEntry3] : cold refrigerant [FreeTextEntry6] : The benefits, risks, and outcomes of kenalog injection were discussed. The risks include infection, hypopigmentation, poor wound healing, fat atrophy, hyperglycemia and dissatisfaction with outcome. The patient understood the risks and elected to proceed with steroid injection.\par \par The right thumb and ring finger was prepared in sterile fashion. \par The injection site was identified and marked. \par Triamcinolone 5 mg was injected into the flexor tendon sheath without issue of the thumb and ring finger\par The patient tolerated the procedure.\par \par total 10 mg\par

## 2020-06-22 NOTE — HISTORY OF PRESENT ILLNESS
[FreeTextEntry1] : 76 yo M with PMH of HTN, CAD s/p cardiac stents x2, DM II and HLD who is RHD and presents today for evaluation of right trigger thumb and 4th finger for the past 5-6 months. Reports audible click at times and inability to extend right thumb fully with significant pain. Right 4th digit is triggering intermittently with less discomfort. Denies any hand trauma or prior steroid injections to either finger. \par \par Occupation- retired  \par Former smoker\par \par Interval hx (6/22/20):  Here for follow up after kenlog injection for trigger finger of right ring and thumb.  he reports that the right thumb no longer locks.  Improvement of frequency of triggering thumb and ring finger.  He is here today inquiring about another kenalog injection.

## 2020-06-22 NOTE — PHYSICAL EXAM
[de-identified] : well-developed male, NAD [de-identified] : NC/AT [de-identified] : PERRL [de-identified] : supple [de-identified] : good inspiratory effort [de-identified] : softly protuberant, nontender  [de-identified] : KATIANAR [de-identified] : Right thumb with improved flexion/extension, mild tenderness to palpation over A1 pulley & 4th digit with + triggering and tender over A1 pulley, otherwise FROM

## 2020-07-02 ENCOUNTER — APPOINTMENT (OUTPATIENT)
Dept: CARDIOLOGY | Facility: CLINIC | Age: 75
End: 2020-07-02
Payer: MEDICARE

## 2020-07-02 ENCOUNTER — RESULT CHARGE (OUTPATIENT)
Age: 75
End: 2020-07-02

## 2020-07-02 VITALS
WEIGHT: 233 LBS | HEART RATE: 78 BPM | TEMPERATURE: 97.9 F | DIASTOLIC BLOOD PRESSURE: 78 MMHG | SYSTOLIC BLOOD PRESSURE: 130 MMHG | BODY MASS INDEX: 33.43 KG/M2

## 2020-07-02 PROCEDURE — 93000 ELECTROCARDIOGRAM COMPLETE: CPT

## 2020-07-02 PROCEDURE — 99214 OFFICE O/P EST MOD 30 MIN: CPT

## 2020-07-02 RX ORDER — ACETAMINOPHEN/DIPHENHYDRAMINE 500MG-25MG
1000 TABLET ORAL DAILY
Refills: 0 | Status: ACTIVE | COMMUNITY

## 2020-07-02 NOTE — REASON FOR VISIT
[Chest Pain] : chest pain [Coronary Artery Disease] : coronary artery disease [Follow-Up - Clinic] : a clinic follow-up of [FreeTextEntry2] : cad, s/p pci and pre-op clearance [Hypertension] : hypertension

## 2020-07-02 NOTE — PHYSICAL EXAM
[Well Groomed] : well groomed [General Appearance - Well Developed] : well developed [Normal Appearance] : normal appearance [General Appearance - Well Nourished] : well nourished [No Deformities] : no deformities [Normal Conjunctiva] : the conjunctiva exhibited no abnormalities [General Appearance - In No Acute Distress] : no acute distress [Eyelids - No Xanthelasma] : the eyelids demonstrated no xanthelasmas [Normal Oral Mucosa] : normal oral mucosa [No Oral Pallor] : no oral pallor [No Oral Cyanosis] : no oral cyanosis [Normal Jugular Venous A Waves Present] : normal jugular venous A waves present [Normal Jugular Venous V Waves Present] : normal jugular venous V waves present [No Jugular Venous Skelton A Waves] : no jugular venous skelton A waves [Respiration, Rhythm And Depth] : normal respiratory rhythm and effort [Exaggerated Use Of Accessory Muscles For Inspiration] : no accessory muscle use [Auscultation Breath Sounds / Voice Sounds] : lungs were clear to auscultation bilaterally [Heart Rate And Rhythm] : heart rate and rhythm were normal [Heart Sounds] : normal S1 and S2 [Murmurs] : no murmurs present [Abdomen Soft] : soft [Abdomen Tenderness] : non-tender [Abdomen Mass (___ Cm)] : no abdominal mass palpated [Abnormal Walk] : normal gait [Nail Clubbing] : no clubbing of the fingernails [Cyanosis, Localized] : no localized cyanosis [Gait - Sufficient For Exercise Testing] : the gait was sufficient for exercise testing [Petechial Hemorrhages (___cm)] : no petechial hemorrhages [No Venous Stasis] : no venous stasis [Skin Color & Pigmentation] : normal skin color and pigmentation [] : no rash [No Skin Ulcers] : no skin ulcer [Skin Lesions] : no skin lesions [Oriented To Time, Place, And Person] : oriented to person, place, and time [No Xanthoma] : no  xanthoma was observed [Affect] : the affect was normal [Mood] : the mood was normal [No Anxiety] : not feeling anxious

## 2020-08-03 ENCOUNTER — APPOINTMENT (OUTPATIENT)
Dept: PLASTIC SURGERY | Facility: CLINIC | Age: 75
End: 2020-08-03

## 2020-12-02 ENCOUNTER — APPOINTMENT (OUTPATIENT)
Dept: CARDIOLOGY | Facility: CLINIC | Age: 75
End: 2020-12-02
Payer: MEDICARE

## 2020-12-02 VITALS
SYSTOLIC BLOOD PRESSURE: 130 MMHG | TEMPERATURE: 95.8 F | DIASTOLIC BLOOD PRESSURE: 84 MMHG | HEIGHT: 70 IN | BODY MASS INDEX: 33.79 KG/M2 | WEIGHT: 236 LBS

## 2020-12-02 PROCEDURE — 93000 ELECTROCARDIOGRAM COMPLETE: CPT

## 2020-12-02 PROCEDURE — 99214 OFFICE O/P EST MOD 30 MIN: CPT

## 2020-12-02 PROCEDURE — 99072 ADDL SUPL MATRL&STAF TM PHE: CPT

## 2020-12-02 RX ORDER — ATORVASTATIN CALCIUM 20 MG/1
20 TABLET, FILM COATED ORAL
Refills: 0 | Status: ACTIVE | COMMUNITY

## 2020-12-16 ENCOUNTER — APPOINTMENT (OUTPATIENT)
Dept: CARDIOLOGY | Facility: CLINIC | Age: 75
End: 2020-12-16
Payer: MEDICARE

## 2020-12-16 DIAGNOSIS — Z01.810 ENCOUNTER FOR PREPROCEDURAL CARDIOVASCULAR EXAMINATION: ICD-10-CM

## 2020-12-16 PROCEDURE — 99072 ADDL SUPL MATRL&STAF TM PHE: CPT

## 2020-12-16 PROCEDURE — 93306 TTE W/DOPPLER COMPLETE: CPT

## 2020-12-16 NOTE — REASON FOR VISIT
[Follow-Up - Clinic] : a clinic follow-up of [Chest Pain] : chest pain [Coronary Artery Disease] : coronary artery disease [Hypertension] : hypertension [FreeTextEntry2] : cad and s/p pci of lcx

## 2020-12-16 NOTE — ASSESSMENT
[FreeTextEntry1] : Coronary artery disease\par STATUS POST CORONARY ANGIOPLASTY OF LEFT CIRCUMFLEX \par CHEST DISCOMFORT-CLASS 1\par No symptoms of congestive heart failure\par Hypertension\par HYPERLIPIDEMIA\par chest pain and exertional dyspnea- r/o progression of cad

## 2020-12-16 NOTE — HISTORY OF PRESENT ILLNESS
[FreeTextEntry1] : This is a 71-year-old, white male, a patient of Dr. Cueva\par The patient has a history of coronary artery disease.\par  He has a history of  angioplasty, with stents placed in the proximal and distal portion of his left circumflex system. \par This was done in July of the year 2014.\par He presently is on aggressive medical therapy. .\par  His last echocardiogram was in 2016. There was mild to moderate tricuspid regurgitation mild to moderate mitral regurgitation.  Overall ventricular ejection fraction was normal.\par nuclear exam in 2016 was normal. \par \par since the last visit, patient c/o atypical chest pain\par admits to exertional sob\par here for further follow up care and possible further cardiac testing\par denies edema, claudication, tia or other manifestations of ashd\par denies arrythmia, palps, pre-syncope or syncope

## 2020-12-17 NOTE — H&P PST ADULT - FUNCTIONAL LEVEL PRIOR: EATING
no rashes , no suspicious lesions , no areas of discoloration , no jaundice present , good turgor , no masses , no tenderness on palpation 0 = independent

## 2020-12-18 PROBLEM — Z01.810 ENCOUNTER FOR PRE-OPERATIVE CARDIOVASCULAR CLEARANCE: Status: ACTIVE | Noted: 2017-09-21

## 2021-01-04 ENCOUNTER — OUTPATIENT (OUTPATIENT)
Dept: OUTPATIENT SERVICES | Facility: HOSPITAL | Age: 76
LOS: 1 days | Discharge: HOME | End: 2021-01-04
Payer: MEDICARE

## 2021-01-04 ENCOUNTER — RESULT REVIEW (OUTPATIENT)
Age: 76
End: 2021-01-04

## 2021-01-04 DIAGNOSIS — I25.10 ATHEROSCLEROTIC HEART DISEASE OF NATIVE CORONARY ARTERY WITHOUT ANGINA PECTORIS: ICD-10-CM

## 2021-01-04 DIAGNOSIS — I10 ESSENTIAL (PRIMARY) HYPERTENSION: ICD-10-CM

## 2021-01-04 DIAGNOSIS — S42.92XS FRACTURE OF LEFT SHOULDER GIRDLE, PART UNSPECIFIED, SEQUELA: Chronic | ICD-10-CM

## 2021-01-04 DIAGNOSIS — E78.5 HYPERLIPIDEMIA, UNSPECIFIED: ICD-10-CM

## 2021-01-04 DIAGNOSIS — I25.10 ATHEROSCLEROTIC HEART DISEASE OF NATIVE CORONARY ARTERY WITHOUT ANGINA PECTORIS: Chronic | ICD-10-CM

## 2021-01-04 DIAGNOSIS — Z41.9 ENCOUNTER FOR PROCEDURE FOR PURPOSES OTHER THAN REMEDYING HEALTH STATE, UNSPECIFIED: Chronic | ICD-10-CM

## 2021-01-04 PROCEDURE — 78452 HT MUSCLE IMAGE SPECT MULT: CPT | Mod: 26

## 2021-01-04 PROCEDURE — 93018 CV STRESS TEST I&R ONLY: CPT

## 2021-01-14 ENCOUNTER — RESULT CHARGE (OUTPATIENT)
Age: 76
End: 2021-01-14

## 2021-01-14 ENCOUNTER — APPOINTMENT (OUTPATIENT)
Dept: CARDIOLOGY | Facility: CLINIC | Age: 76
End: 2021-01-14
Payer: COMMERCIAL

## 2021-01-14 VITALS
WEIGHT: 233 LBS | SYSTOLIC BLOOD PRESSURE: 126 MMHG | BODY MASS INDEX: 33.36 KG/M2 | TEMPERATURE: 97.2 F | HEIGHT: 70 IN | DIASTOLIC BLOOD PRESSURE: 70 MMHG

## 2021-01-14 DIAGNOSIS — Z78.9 OTHER SPECIFIED HEALTH STATUS: ICD-10-CM

## 2021-01-14 PROCEDURE — 99072 ADDL SUPL MATRL&STAF TM PHE: CPT

## 2021-01-14 PROCEDURE — 93000 ELECTROCARDIOGRAM COMPLETE: CPT

## 2021-01-14 PROCEDURE — 99214 OFFICE O/P EST MOD 30 MIN: CPT

## 2021-01-16 ENCOUNTER — LABORATORY RESULT (OUTPATIENT)
Age: 76
End: 2021-01-16

## 2021-01-16 ENCOUNTER — OUTPATIENT (OUTPATIENT)
Dept: OUTPATIENT SERVICES | Facility: HOSPITAL | Age: 76
LOS: 1 days | Discharge: HOME | End: 2021-01-16

## 2021-01-16 DIAGNOSIS — I25.10 ATHEROSCLEROTIC HEART DISEASE OF NATIVE CORONARY ARTERY WITHOUT ANGINA PECTORIS: Chronic | ICD-10-CM

## 2021-01-16 DIAGNOSIS — Z41.9 ENCOUNTER FOR PROCEDURE FOR PURPOSES OTHER THAN REMEDYING HEALTH STATE, UNSPECIFIED: Chronic | ICD-10-CM

## 2021-01-16 DIAGNOSIS — S42.92XS FRACTURE OF LEFT SHOULDER GIRDLE, PART UNSPECIFIED, SEQUELA: Chronic | ICD-10-CM

## 2021-01-16 DIAGNOSIS — Z11.59 ENCOUNTER FOR SCREENING FOR OTHER VIRAL DISEASES: ICD-10-CM

## 2021-01-19 ENCOUNTER — OUTPATIENT (OUTPATIENT)
Dept: OUTPATIENT SERVICES | Facility: HOSPITAL | Age: 76
LOS: 1 days | Discharge: HOME | End: 2021-01-19
Payer: MEDICARE

## 2021-01-19 VITALS
SYSTOLIC BLOOD PRESSURE: 137 MMHG | TEMPERATURE: 98 F | DIASTOLIC BLOOD PRESSURE: 66 MMHG | RESPIRATION RATE: 18 BRPM | OXYGEN SATURATION: 96 % | HEART RATE: 62 BPM

## 2021-01-19 VITALS — WEIGHT: 233.03 LBS | HEIGHT: 70 IN

## 2021-01-19 DIAGNOSIS — I25.10 ATHEROSCLEROTIC HEART DISEASE OF NATIVE CORONARY ARTERY WITHOUT ANGINA PECTORIS: Chronic | ICD-10-CM

## 2021-01-19 DIAGNOSIS — S42.92XS FRACTURE OF LEFT SHOULDER GIRDLE, PART UNSPECIFIED, SEQUELA: Chronic | ICD-10-CM

## 2021-01-19 DIAGNOSIS — Z41.9 ENCOUNTER FOR PROCEDURE FOR PURPOSES OTHER THAN REMEDYING HEALTH STATE, UNSPECIFIED: Chronic | ICD-10-CM

## 2021-01-19 LAB
ANION GAP SERPL CALC-SCNC: 11 MMOL/L — SIGNIFICANT CHANGE UP (ref 7–14)
ANION GAP SERPL CALC-SCNC: 8 MMOL/L — SIGNIFICANT CHANGE UP (ref 7–14)
BUN SERPL-MCNC: 13 MG/DL — SIGNIFICANT CHANGE UP (ref 10–20)
BUN SERPL-MCNC: 15 MG/DL — SIGNIFICANT CHANGE UP (ref 10–20)
CALCIUM SERPL-MCNC: 8.7 MG/DL — SIGNIFICANT CHANGE UP (ref 8.5–10.1)
CALCIUM SERPL-MCNC: 9 MG/DL — SIGNIFICANT CHANGE UP (ref 8.5–10.1)
CHLORIDE SERPL-SCNC: 102 MMOL/L — SIGNIFICANT CHANGE UP (ref 98–110)
CHLORIDE SERPL-SCNC: 103 MMOL/L — SIGNIFICANT CHANGE UP (ref 98–110)
CO2 SERPL-SCNC: 21 MMOL/L — SIGNIFICANT CHANGE UP (ref 17–32)
CO2 SERPL-SCNC: 26 MMOL/L — SIGNIFICANT CHANGE UP (ref 17–32)
CREAT SERPL-MCNC: 1 MG/DL — SIGNIFICANT CHANGE UP (ref 0.7–1.5)
CREAT SERPL-MCNC: 1 MG/DL — SIGNIFICANT CHANGE UP (ref 0.7–1.5)
GLUCOSE SERPL-MCNC: 131 MG/DL — HIGH (ref 70–99)
GLUCOSE SERPL-MCNC: 83 MG/DL — SIGNIFICANT CHANGE UP (ref 70–99)
HCT VFR BLD CALC: 37 % — LOW (ref 42–52)
HCT VFR BLD CALC: 42 % — SIGNIFICANT CHANGE UP (ref 42–52)
HGB BLD-MCNC: 12.4 G/DL — LOW (ref 14–18)
HGB BLD-MCNC: 13.8 G/DL — LOW (ref 14–18)
MCHC RBC-ENTMCNC: 28.6 PG — SIGNIFICANT CHANGE UP (ref 27–31)
MCHC RBC-ENTMCNC: 29.1 PG — SIGNIFICANT CHANGE UP (ref 27–31)
MCHC RBC-ENTMCNC: 32.9 G/DL — SIGNIFICANT CHANGE UP (ref 32–37)
MCHC RBC-ENTMCNC: 33.5 G/DL — SIGNIFICANT CHANGE UP (ref 32–37)
MCV RBC AUTO: 86.9 FL — SIGNIFICANT CHANGE UP (ref 80–94)
MCV RBC AUTO: 87 FL — SIGNIFICANT CHANGE UP (ref 80–94)
NRBC # BLD: 0 /100 WBCS — SIGNIFICANT CHANGE UP (ref 0–0)
NRBC # BLD: 0 /100 WBCS — SIGNIFICANT CHANGE UP (ref 0–0)
PLATELET # BLD AUTO: 228 K/UL — SIGNIFICANT CHANGE UP (ref 130–400)
PLATELET # BLD AUTO: 253 K/UL — SIGNIFICANT CHANGE UP (ref 130–400)
POTASSIUM SERPL-MCNC: 3.9 MMOL/L — SIGNIFICANT CHANGE UP (ref 3.5–5)
POTASSIUM SERPL-MCNC: 5.2 MMOL/L — HIGH (ref 3.5–5)
POTASSIUM SERPL-SCNC: 3.9 MMOL/L — SIGNIFICANT CHANGE UP (ref 3.5–5)
POTASSIUM SERPL-SCNC: 5.2 MMOL/L — HIGH (ref 3.5–5)
RBC # BLD: 4.26 M/UL — LOW (ref 4.7–6.1)
RBC # BLD: 4.83 M/UL — SIGNIFICANT CHANGE UP (ref 4.7–6.1)
RBC # FLD: 12.7 % — SIGNIFICANT CHANGE UP (ref 11.5–14.5)
RBC # FLD: 12.8 % — SIGNIFICANT CHANGE UP (ref 11.5–14.5)
SODIUM SERPL-SCNC: 134 MMOL/L — LOW (ref 135–146)
SODIUM SERPL-SCNC: 137 MMOL/L — SIGNIFICANT CHANGE UP (ref 135–146)
WBC # BLD: 5.94 K/UL — SIGNIFICANT CHANGE UP (ref 4.8–10.8)
WBC # BLD: 6.76 K/UL — SIGNIFICANT CHANGE UP (ref 4.8–10.8)
WBC # FLD AUTO: 5.94 K/UL — SIGNIFICANT CHANGE UP (ref 4.8–10.8)
WBC # FLD AUTO: 6.76 K/UL — SIGNIFICANT CHANGE UP (ref 4.8–10.8)

## 2021-01-19 PROCEDURE — 92928 PRQ TCAT PLMT NTRAC ST 1 LES: CPT | Mod: LC

## 2021-01-19 PROCEDURE — 93010 ELECTROCARDIOGRAM REPORT: CPT

## 2021-01-19 PROCEDURE — 92920 PRQ TRLUML C ANGIOP 1ART&/BR: CPT | Mod: LC,59

## 2021-01-19 PROCEDURE — 93458 L HRT ARTERY/VENTRICLE ANGIO: CPT | Mod: 26,59

## 2021-01-19 RX ORDER — CLOPIDOGREL BISULFATE 75 MG/1
1 TABLET, FILM COATED ORAL
Qty: 30 | Refills: 1
Start: 2021-01-19 | End: 2021-03-19

## 2021-01-19 RX ORDER — TICAGRELOR 90 MG/1
1 TABLET ORAL
Qty: 60 | Refills: 1
Start: 2021-01-19 | End: 2021-03-19

## 2021-01-19 RX ORDER — METFORMIN HYDROCHLORIDE 850 MG/1
1 TABLET ORAL
Qty: 0 | Refills: 0 | DISCHARGE

## 2021-01-19 NOTE — H&P CARDIOLOGY - HISTORY OF PRESENT ILLNESS
Patient is a 75y Male who presents to the cardiology department for C.       Pre cath note:    indication:  [ ] STEMI                [ ] NSTEMI                 [ ] Acute coronary syndrome                     [ ]Unstable Angina   [ ] high risk  [ ] intermediate risk  [ ] low risk                     [ ] Stable Angina     non-invasive testing:                          Date:          1/4/21           result: [x ] high risk  [ ] intermediate risk  [ ] low risk    Anti- Anginal medications:                    [ ] not used                       [ ] used                   [ ] not used but strong indication not to use    Ejection Fraction                   [ ] <29            [ ] 30-39%   [ ] 40-49%     [ x]>50%    CHF                   [ ] active (within last 14 days on meds   [ ] Chronic (on meds but no exacerbation)    COPD                   [ ] mild (on chronic bronchodilators)  [ ] moderate (on chronic steroid therapy)      [ ] severe (indication for home O2 or PACO2 >50)    Other risk factors:                       [ ] Previous MI                     [ ] CVA/ stroke                    [ ] carotid stent/ CEA                    [ ] PVD/PAD- (arterial aneurysm, non-palpable pulses, tortuous vessel with inability to insert catheter, infra-renal dissection, renal or subclavian artery stenosis)                    [x ] diabetic                    [ ] previous CABG                    [ ] Renal Failure     Adjusted Bleeding risk:    PENDING Hgb for risk %       SUBJ:  74 y/o male presents for [facility] s/p Nuclear Stress test 1/4/21.  Impression moderate in size and severity lateral defect with near complete reversibility of LCA.  Pt c/o fatigue worsening over last few months. Pt is without complaint today. Denies chest pain. No SOB, No palpitations.     PMH:     CAD, PCI x 2  HTN  HLD  DM      REVIEW OF SYSTEMS:  CONSTITUTIONAL: No fever, weight loss, or fatigue  CARDIOLOGY: PAtient denies chest pain, shortness of breath or syncopal episodes.   RESPIRATORY: denies shortness of breath, wheezing.   NEUROLOGICAL: NO weakness, no focal deficits to report.  GI: no BRBPR, no N,V,diarrhea.     PHYSICAL EXAM:  · CONSTITUTIONAL:	Well-developed, well nourished     · RESPIRATORY:   airway patent; breath sounds equal; good air movement; respirations non-labored; clear to auscultation bilaterally; no chest wall tenderness; no intercostal retractions; no rales,rhonchi or wheeze  · CARDIOVASCULAR	regular rate and rhythm  no rub  no murmur    · EXTREMITIES: No cyanosis, clubbing or edema  · VASCULAR: 	Equal and normal pulses (carotid, femoral, dorsalis pedis)  	Wenceslao Test :   WNL

## 2021-01-19 NOTE — PROGRESS NOTE ADULT - SUBJECTIVE AND OBJECTIVE BOX
Cardiology Follow up    GAMAL MEDELLIN   75y Male  PAST MEDICAL & SURGICAL HISTORY:  DM (diabetes mellitus)    Hypercholesteremia    HTN (hypertension)    CAD (coronary artery disease)  PCI with 2 stents    Closed fracture of left shoulder, sequela  Dislocation    Elective surgery  Cholecystectomy 1 yr ago    Coronary arteriosclerosis after percutaneous transluminal coronary angioplasty (PTCA)  2 Stents 07/1/2014      SUBJ:  74 y/o male presents for [facility] s/p Nuclear Stress test 1/4/21.  Impression moderate in size and severity lateral defect with near complete reversibility of LCA.  Pt c/o fatigue worsening over last few months. Pt is without complaint today. Denies chest pain. No SOB, No palpitations.     PMH:     CAD, PCI x 2  HTN  HLD  DM      Allergies    No Known Allergies    Intolerances      Patient without complaints. Pt ambulated without issues/symptoms  Denies CP, SOB, palpitations, or dizziness  No events on telemetry     Vital Signs Last 24 Hrs     PENDING   T(F):   RR:   HR:   BP:   SpO2:      REVIEW OF SYSTEMS:          All negative except as mentioned in HPI    PHYSICAL EXAM:           CONSTITUTIONAL: Well-developed; well-nourished; in no acute distress  	SKIN: warm, dry  	HEAD: Normocephalic; atraumatic  	EYES: PERRL.  	ENT: No nasal discharge, airway clear, mucous membranes moist  	NECK: Supple; non tender.  	CARD: +S1, +S2, no murmurs, gallops, or rubs. Regular rate and rhythm    	RESP: No wheezes, rales or rhonchi. CTA B/L  	ABD: soft ntnd, + BS x 4 quadrants  	EXT: moves all extremities,  no clubbing, cyanosis or edema  	NEURO: Alert and oriented x3, no focal deficits          PSYCH: Cooperative, appropriate          VASCULAR:  +2 Rad / +2 PTs / + 2 DPs          EXTREMITY:          	   Right Radial: Dressing D/C/I, D Stat in place,  access site soft, no hematoma, no pain, + pulses/same as baseline, no sign of infection, no numbness              ECG:                PENDING 1800                                                                                                   LABS:              PENDING 1800                  13.8   5.94  )-----------( 253      ( 19 Jan 2021 10:33 )             42.0     01-19    134<L>  |  102  |  15  ----------------------------<  131<H>  5.2<H>   |  21  |  1.0    Ca    9.0      19 Jan 2021 10:33              A/P:  I discussed the case with Cardiologist Dr. Jimenez and recommend the following:    S/P PCI:    INTERVENTION  PCI to OM2 with balloon angioplasty and STARR: SYNERGY 2.75 x 16 mm      POST-OP DIAGNOSIS  Patent stent in Prox/Distal LCx, significant lesion in OM2 that correlate with abnormal NST, AUC score 7 rec A for revascularization         	         Continue DAPT ( Aspirin 81 mg PO Daily and    Brilinta 90mg po twice a day. ),  B-Blocker, Statin Therapy                   Patient pharmacy called in for Brilinta  prescription and it is  --  $ per month, patient aware and agreeable, Rx available for pickup                   CBC/BMP @ 1800                   EKG prior to d/c @1800                   NS @ --100-- ml/hr x 6hrs                   Monitor access site                   Patient agreeing to take DAPT for at least one year or as directed by cardiologist                    Pt given instructions on importance of taking antiplatelet medication or risk acute stent thrombosis/death                   Post cath instructions, access site care and activity restrictions reviewed with patient                     Discussed with patient to return to hospital if experience chest pain, shortness breath, dizziness and site bleeding                   Aggressive risk factor modification, diet counseling, smoking cessation discussed with patient                       Can discharge patient @-2000- from cardiac standpoint after ambulating without symptoms and access site wnl and ECG reviewed                    Follow up with Cardiology Dr. Jimenez   in one to two weeks.  Instructed to call and make an appointment                                       Cardiology Follow up    GAMAL MEDELLIN   75y Male  PAST MEDICAL & SURGICAL HISTORY:  DM (diabetes mellitus)    Hypercholesteremia    HTN (hypertension)    CAD (coronary artery disease)  PCI with 2 stents    Closed fracture of left shoulder, sequela  Dislocation    Elective surgery  Cholecystectomy 1 yr ago    Coronary arteriosclerosis after percutaneous transluminal coronary angioplasty (PTCA)  2 Stents 07/1/2014      SUBJ:  74 y/o male presents for [facility] s/p Nuclear Stress test 1/4/21.  Impression moderate in size and severity lateral defect with near complete reversibility of LCA.  Pt c/o fatigue worsening over last few months. Pt is without complaint today. Denies chest pain. No SOB, No palpitations.     PMH:     CAD, PCI x 2  HTN  HLD  DM      Allergies    No Known Allergies    Intolerances      Patient without complaints. Pt ambulated without issues/symptoms  Denies CP, SOB, palpitations, or dizziness  No events on telemetry     Vital Signs Last 24 Hrs     PENDING   T(F):   RR:   HR:   BP:   SpO2:      REVIEW OF SYSTEMS:          All negative except as mentioned in HPI    PHYSICAL EXAM:           CONSTITUTIONAL: Well-developed; well-nourished; in no acute distress  	SKIN: warm, dry  	HEAD: Normocephalic; atraumatic  	EYES: PERRL.  	ENT: No nasal discharge, airway clear, mucous membranes moist  	NECK: Supple; non tender.  	CARD: +S1, +S2, no murmurs, gallops, or rubs. Regular rate and rhythm    	RESP: No wheezes, rales or rhonchi. CTA B/L  	ABD: soft ntnd, + BS x 4 quadrants  	EXT: moves all extremities,  no clubbing, cyanosis or edema  	NEURO: Alert and oriented x3, no focal deficits          PSYCH: Cooperative, appropriate          VASCULAR:  +2 Rad / +2 PTs / + 2 DPs          EXTREMITY:          	   Right Radial: Dressing D/C/I, D Stat in place,  access site soft, no hematoma, no pain, + pulses/same as baseline, no sign of infection, no numbness              ECG:                PENDING 1800                                                                                                   LABS:              PENDING 1800                  13.8   5.94  )-----------( 253      ( 19 Jan 2021 10:33 )             42.0     01-19    134<L>  |  102  |  15  ----------------------------<  131<H>  5.2<H>   |  21  |  1.0    Ca    9.0      19 Jan 2021 10:33              A/P:  I discussed the case with Cardiologist Dr. Jimenez and recommend the following:    S/P PCI:    INTERVENTION  PCI to OM2 with balloon angioplasty and STARR: SYNERGY 2.75 x 16 mm      POST-OP DIAGNOSIS  Patent stent in Prox/Distal LCx, significant lesion in OM2 that correlate with abnormal NST, AUC score 7 rec A for revascularization         	         Continue DAPT ( Aspirin 81 mg PO Daily and    Brilinta 90mg po twice a day. ),  B-Blocker, Statin Therapy                   Patient pharmacy called in for Brilinta  prescription and it is  -$350-  per month, patient                    CBC/BMP @ 1800                   EKG prior to d/c @1800                   NS @ --100-- ml/hr x 6hrs                   Monitor access site                   Patient agreeing to take DAPT for at least one year or as directed by cardiologist                    Pt given instructions on importance of taking antiplatelet medication or risk acute stent thrombosis/death                   Post cath instructions, access site care and activity restrictions reviewed with patient                     Discussed with patient to return to hospital if experience chest pain, shortness breath, dizziness and site bleeding                   Aggressive risk factor modification, diet counseling, smoking cessation discussed with patient                       Can discharge patient @-2000- from cardiac standpoint after ambulating without symptoms and access site wnl and ECG reviewed                    Follow up with Cardiology Dr. Jimenez   in one to two weeks.  Instructed to call and make an appointment                                       Cardiology Follow up    GAMAL MEDELLIN   75y Male  PAST MEDICAL & SURGICAL HISTORY:  DM (diabetes mellitus)    Hypercholesteremia    HTN (hypertension)    CAD (coronary artery disease)  PCI with 2 stents    Closed fracture of left shoulder, sequela  Dislocation    Elective surgery  Cholecystectomy 1 yr ago    Coronary arteriosclerosis after percutaneous transluminal coronary angioplasty (PTCA)  2 Stents 07/1/2014      SUBJ:  74 y/o male presents for [facility] s/p Nuclear Stress test 1/4/21.  Impression moderate in size and severity lateral defect with near complete reversibility of LCA.  Pt c/o fatigue worsening over last few months. Pt is without complaint today. Denies chest pain. No SOB, No palpitations.     PMH:     CAD, PCI x 2  HTN  HLD  DM      Allergies    No Known Allergies    Intolerances      Patient without complaints. Pt ambulated without issues/symptoms  Denies CP, SOB, palpitations, or dizziness  No events on telemetry     Vital Signs Last 24 Hrs     PENDING   T(F):   RR:   HR:   BP:   SpO2:      REVIEW OF SYSTEMS:          All negative except as mentioned in HPI    PHYSICAL EXAM:           CONSTITUTIONAL: Well-developed; well-nourished; in no acute distress  	SKIN: warm, dry  	HEAD: Normocephalic; atraumatic  	EYES: PERRL.  	ENT: No nasal discharge, airway clear, mucous membranes moist  	NECK: Supple; non tender.  	CARD: +S1, +S2, no murmurs, gallops, or rubs. Regular rate and rhythm    	RESP: No wheezes, rales or rhonchi. CTA B/L  	ABD: soft ntnd, + BS x 4 quadrants  	EXT: moves all extremities,  no clubbing, cyanosis or edema  	NEURO: Alert and oriented x3, no focal deficits          PSYCH: Cooperative, appropriate          VASCULAR:  +2 Rad / +2 PTs / + 2 DPs          EXTREMITY:          	   Right Radial: Dressing D/C/I, D Stat in place,  access site soft, no hematoma, no pain, + pulses/same as baseline, no sign of infection, no numbness              ECG:                PENDING 1800                                                                                                   LABS:              PENDING 1800                  13.8   5.94  )-----------( 253      ( 19 Jan 2021 10:33 )             42.0     01-19    134<L>  |  102  |  15  ----------------------------<  131<H>  5.2<H>   |  21  |  1.0    Ca    9.0      19 Jan 2021 10:33              A/P:  I discussed the case with Cardiologist Dr. Jimenez and recommend the following:    S/P PCI:    INTERVENTION  PCI to OM2 with balloon angioplasty and STARR: SYNERGY 2.75 x 16 mm      POST-OP DIAGNOSIS  Patent stent in Prox/Distal LCx, significant lesion in OM2 that correlate with abnormal NST, AUC score 7 rec A for revascularization         	         Continue DAPT ( Aspirin 81 mg PO Daily and  Plavix 75mg po daily ),  B-Blocker, Statin Therapy                   No ACE ARB at this time. Will reeval on follow up in office.                    ASA/Plavix 30day Post PCI package given to patient for home use.                     CBC/BMP @ 1800                   EKG prior to d/c @1800                   NS @ --100-- ml/hr x 6hrs                   Monitor access site                   Patient agreeing to take DAPT for at least one year or as directed by cardiologist                    Pt given instructions on importance of taking antiplatelet medication or risk acute stent thrombosis/death                   Post cath instructions, access site care and activity restrictions reviewed with patient                     Discussed with patient to return to hospital if experience chest pain, shortness breath, dizziness and site bleeding                   Aggressive risk factor modification, diet counseling, smoking cessation discussed with patient                       Can discharge patient @-1930- from cardiac standpoint after ambulating without symptoms and access site wnl and ECG reviewed                    Follow up with Cardiology Dr. Jimenez   in one to two weeks.  Instructed to call and make an appointment

## 2021-01-19 NOTE — CHART NOTE - NSCHARTNOTEFT_GEN_A_CORE
PRE-OP DIAGNOSIS: abnormal stress test, CAD s/p PCI to LCx, HTN, DM DL    PROCEDURE: [facility] with coronary angiography    Physician: Dr Jimenez   Assistant: Tiny    ANESTHESIA TYPE:  [  ]General Anesthesia  [  ] Sedation  [ x ] Local/Regional    ESTIMATED BLOOD LOSS:    10   mL    CONDITION  [  ] Critical  [  ] Serious  [  ]Fair  [ x ]Good      SPECIMENS REMOVED (IF APPLICABLE): N/A      IV CONTRAST:      90       mL      IMPLANTS (IF APPLICABLE)      FINDINGS    Left Heart Catheterization:  LVEF%: 40  LVEDP: normal       LEFT HEART CATHETERIZATION                                    Left main normal     LAD: Prox 40-50% lesion, MId/distal mild disease                      Diag:  moderate disease, small     Left Circumflex: Prox patent stent, Distal patent stent , moderate disease, 50% lesion distal to stent  OM1 small patent  OM2: medium size 80% lesion     Right Coronary Artery: moderate disease Mid 30-40% lesion  RPDA patent       DOMINANCE: Right    ACCESS: Right radial  CLOSURE: D stat     INTERVENTION  PCI to OM2 with balloon angioplasty and STARR: SYNERGY 2.75 x 16 mm      POST-OP DIAGNOSIS  Patent stent in Prox/Distal LCx, significant lesion in OM2 that correlate with abnormal NST, AUC score 7 rec A for revascularization         PLAN OF CARE  [ x] D/C Home today  [ x]  Continue DAPT, B-blocker & Statin therapy  IV hydration   Follow up at office in 2 weeks   Results of procedure/ plan of care discussed with patient/  in detail.

## 2021-01-25 DIAGNOSIS — I10 ESSENTIAL (PRIMARY) HYPERTENSION: ICD-10-CM

## 2021-01-25 DIAGNOSIS — Z79.82 LONG TERM (CURRENT) USE OF ASPIRIN: ICD-10-CM

## 2021-01-25 DIAGNOSIS — E78.5 HYPERLIPIDEMIA, UNSPECIFIED: ICD-10-CM

## 2021-01-25 DIAGNOSIS — I25.10 ATHEROSCLEROTIC HEART DISEASE OF NATIVE CORONARY ARTERY WITHOUT ANGINA PECTORIS: ICD-10-CM

## 2021-01-25 DIAGNOSIS — I20.8 OTHER FORMS OF ANGINA PECTORIS: ICD-10-CM

## 2021-01-25 DIAGNOSIS — Z79.84 LONG TERM (CURRENT) USE OF ORAL HYPOGLYCEMIC DRUGS: ICD-10-CM

## 2021-01-25 DIAGNOSIS — E11.9 TYPE 2 DIABETES MELLITUS WITHOUT COMPLICATIONS: ICD-10-CM

## 2021-02-02 LAB
ANION GAP SERPL CALC-SCNC: 16 MMOL/L
BUN SERPL-MCNC: 20 MG/DL
CALCIUM SERPL-MCNC: 9.8 MG/DL
CHLORIDE SERPL-SCNC: 97 MMOL/L
CO2 SERPL-SCNC: 24 MMOL/L
CREAT SERPL-MCNC: 1.2 MG/DL
GLUCOSE SERPL-MCNC: 112 MG/DL
POTASSIUM SERPL-SCNC: 4.4 MMOL/L
SODIUM SERPL-SCNC: 137 MMOL/L

## 2021-03-31 ENCOUNTER — APPOINTMENT (OUTPATIENT)
Dept: CARDIOLOGY | Facility: CLINIC | Age: 76
End: 2021-03-31
Payer: MEDICARE

## 2021-03-31 VITALS
DIASTOLIC BLOOD PRESSURE: 70 MMHG | WEIGHT: 228 LBS | BODY MASS INDEX: 32.64 KG/M2 | TEMPERATURE: 96.8 F | SYSTOLIC BLOOD PRESSURE: 128 MMHG | HEIGHT: 70 IN | HEART RATE: 78 BPM

## 2021-03-31 PROCEDURE — 99072 ADDL SUPL MATRL&STAF TM PHE: CPT

## 2021-03-31 PROCEDURE — 93000 ELECTROCARDIOGRAM COMPLETE: CPT

## 2021-03-31 PROCEDURE — 99214 OFFICE O/P EST MOD 30 MIN: CPT

## 2021-03-31 RX ORDER — FAMOTIDINE 40 MG/1
40 TABLET, FILM COATED ORAL
Qty: 90 | Refills: 0 | Status: ACTIVE | COMMUNITY
Start: 2021-03-29

## 2021-03-31 NOTE — PHYSICAL EXAM
[General Appearance - Well Developed] : well developed [Normal Appearance] : normal appearance [Well Groomed] : well groomed [General Appearance - Well Nourished] : well nourished [No Deformities] : no deformities [General Appearance - In No Acute Distress] : no acute distress [Normal Conjunctiva] : the conjunctiva exhibited no abnormalities [Eyelids - No Xanthelasma] : the eyelids demonstrated no xanthelasmas [Normal Oral Mucosa] : normal oral mucosa [No Oral Pallor] : no oral pallor [No Oral Cyanosis] : no oral cyanosis [Normal Jugular Venous A Waves Present] : normal jugular venous A waves present [Normal Jugular Venous V Waves Present] : normal jugular venous V waves present [No Jugular Venous Skelton A Waves] : no jugular venous skelton A waves [Respiration, Rhythm And Depth] : normal respiratory rhythm and effort [Exaggerated Use Of Accessory Muscles For Inspiration] : no accessory muscle use [Auscultation Breath Sounds / Voice Sounds] : lungs were clear to auscultation bilaterally [Heart Rate And Rhythm] : heart rate and rhythm were normal [Heart Sounds] : normal S1 and S2 [Murmurs] : no murmurs present [Abdomen Soft] : soft [Abdomen Tenderness] : non-tender [Abdomen Mass (___ Cm)] : no abdominal mass palpated [Abnormal Walk] : normal gait [Gait - Sufficient For Exercise Testing] : the gait was sufficient for exercise testing [Nail Clubbing] : no clubbing of the fingernails [Petechial Hemorrhages (___cm)] : no petechial hemorrhages [Cyanosis, Localized] : no localized cyanosis [Skin Color & Pigmentation] : normal skin color and pigmentation [] : no rash [No Venous Stasis] : no venous stasis [Skin Lesions] : no skin lesions [No Skin Ulcers] : no skin ulcer [No Xanthoma] : no  xanthoma was observed [Oriented To Time, Place, And Person] : oriented to person, place, and time [Affect] : the affect was normal [Mood] : the mood was normal [No Anxiety] : not feeling anxious

## 2021-04-29 NOTE — H&P PST ADULT - LIVES WITH, PROFILE
Called and spoke to patient with regards to most recent blood work results and urine testing results  Advised him at this time some of the hormone testing results for Aldosterone except read still pending if there is anything abnormal I will let him know  He reports his blood pressures have been stable in the 130s range on the regimen he was sign he is still taking the hydralazine also  Advised him to continue with the plan as discussed at the visit in terms of getting repeat blood work in 1 month and calling me with updates with his blood pressures have any issues  All questions and concerns were answered 
spouse

## 2021-08-12 ENCOUNTER — APPOINTMENT (OUTPATIENT)
Dept: CARDIOLOGY | Facility: CLINIC | Age: 76
End: 2021-08-12

## 2022-01-12 ENCOUNTER — APPOINTMENT (OUTPATIENT)
Dept: CARDIOLOGY | Facility: CLINIC | Age: 77
End: 2022-01-12
Payer: MEDICARE

## 2022-01-12 VITALS
WEIGHT: 224 LBS | HEIGHT: 70 IN | BODY MASS INDEX: 32.07 KG/M2 | HEART RATE: 69 BPM | DIASTOLIC BLOOD PRESSURE: 78 MMHG | SYSTOLIC BLOOD PRESSURE: 120 MMHG | TEMPERATURE: 96.8 F

## 2022-01-12 PROCEDURE — 99214 OFFICE O/P EST MOD 30 MIN: CPT

## 2022-01-12 PROCEDURE — 93000 ELECTROCARDIOGRAM COMPLETE: CPT

## 2022-01-12 NOTE — ASSESSMENT
[FreeTextEntry1] : Coronary artery disease\par STATUS POST CORONARY ANGIOPLASTY OF LEFT CIRCUMFLEX \par CHEST DISCOMFORT-CLASS 1\par No symptoms of congestive heart failure\par Hypertension\par HYPERLIPIDEMIA\par

## 2022-01-12 NOTE — HISTORY OF PRESENT ILLNESS
[FreeTextEntry1] : This is a 71-year-old, white male, a patient of Dr. Cueva\par The patient has a history of coronary artery disease.\par  He has a history of  angioplasty, with stents placed in the proximal and distal portion of his left circumflex system. \par This was done in July of the year 2014.\par He presently is on aggressive medical therapy. .\par  His last echocardiogram was in 2016. There was mild to moderate tricuspid regurgitation mild to moderate mitral regurgitation.  Overall ventricular ejection fraction was normal.\par nuclear exam in 2016 was normal. \par \par since the last visit, patient c/o atypical chest pain\par admits to exertional sob\par here for further follow up care and possible further cardiac testing\par denies edema, claudication, tia or other manifestations of ashd\par denies arrythmia, palps, pre-syncope or syncope\par \par 1/2021 patient had pci of lcx\par doing well\par no complications\par no chest pain or sob\par no cardiac complaints\par \par nop new complaints\par will d/c plavix

## 2022-05-01 NOTE — ASU PATIENT PROFILE, ADULT - NS PRO ABUSE SCREEN SUSPICION NEGLECT YN
Pt Name: Meño Kim  MRN: [id number]  [place] 1961  Age/Sex: 62 y o  female  Date of evaluation: 5/1/2022  PCP: Shailesh Cantu MD    [address] [place] [address]    Iram Lawson Brigid Andrews Add [[id number],  2000 [facility] Emergency Department Emergency Medicine Go to  If symptoms worsen [address] [facility] [id number] [facility] Emergency Department, [place], [place], [address] [place] [address], MD Internal Medicine Call  As needed 301 [facility]  [address] [place]       521 [facility] Orthopedic Surgery Call in 1 day To schedule close followup for your suspected meniscus injury 819 [facility]  [address] , [address] [address] [place], [place], [address]            PATIENT REFERRED TO:    [facility]  [address] [facility] [address] [phone]  Go to   If symptoms worsen    Justina Hayes MD  [address]  9352 [facility] 89  [phone]    Call   As needed    230 [facility]  [address] 243 [facility]  no

## 2022-07-11 ENCOUNTER — APPOINTMENT (OUTPATIENT)
Dept: PLASTIC SURGERY | Facility: CLINIC | Age: 77
End: 2022-07-11

## 2022-07-11 PROCEDURE — 99212 OFFICE O/P EST SF 10 MIN: CPT | Mod: 25

## 2022-07-11 PROCEDURE — 20550 NJX 1 TENDON SHEATH/LIGAMENT: CPT

## 2022-07-11 NOTE — HISTORY OF PRESENT ILLNESS
[FreeTextEntry1] : 74 yo M with PMH of HTN, CAD s/p cardiac stents x2, DM II and HLD who is learned RHD and presents today for evaluation of right trigger thumb and 4th finger for the past 5-6 months. Reports audible click at times and inability to extend right thumb fully with significant pain. Right 4th digit is triggering intermittently with less discomfort. Denies any hand trauma or prior steroid injections to either finger. \par \par Occupation- retired  \par Former smoker\par \par Interval hx (6/22/20):  Here for follow up after kenlog injection for trigger finger of right ring and thumb.  he reports that the right thumb no longer locks.  Improvement of frequency of triggering thumb and ring finger.  He is here today inquiring about another kenalog injection.\par \par Interval hx (7/11/22): 76 y/o male s/p 2 kenalog injections for TF of R ring finger & thumb with relief, denies any residual triggering in right fingers. Now presents  c/o triggering of left middle finger & thumb x 2 weeks with no improvement.  Here to discuss treatment for TF of LMF and L thumb.  Denies fevers, chills.\par \par

## 2022-07-11 NOTE — PROCEDURE
[Nl] : None [FreeTextEntry1] : left thumb and middle trigger finger [FreeTextEntry2] : left thumb and middle trigger finger kenalog injection [FreeTextEntry3] : col refrigerant [FreeTextEntry6] : The benefits, risks, and outcomes of kenalog injection were discussed. The risks include infection, hypopigmentation, poor wound healing, fat atrophy, hyperglycemia and dissatisfaction with outcome. The patient understood the risks and elected to proceed with steroid injection.\par \par The left middle finger and thumb were prepared in sterile fashion. \par The injection site was identified and marked. \par Triamcinolone 5 mg was injected into each flexor tendon sheath without issue. \par The patient tolerated the procedure.\par  [FreeTextEntry7] : n/a

## 2022-07-11 NOTE — PHYSICAL EXAM
[de-identified] : well-developed male, NAD [de-identified] : NC/AT [de-identified] : PERRL [de-identified] : supple [de-identified] : good inspiratory effort [de-identified] : KATIANAR [de-identified] : softly protuberant, nontender  [de-identified] : Right hand:  thumb with improved flexion/extension, FROM, no palpable triggering at A1 in any digit\par Left hand: FROM all digit, triggering left thumb and MF, +palpalbe A1 nodule and +triggering\par

## 2022-07-11 NOTE — ASSESSMENT
[FreeTextEntry1] : 78 yo RHD diabetic M with right thumb and right 4th trigger finger. s/p 2 kenalog injections with total improvement in trigger symptoms. Now c/o triggering in left middle finger and thumb. \par \par s/p kenalog injection LMF and left thumb (7/11/22)\par \par -Pt tolerated the procedure\par -B/R/A reviewed\par -Informed consent obtained\par -All questions were answered\par -Follow up in 6 weeks for TF check\par \par Due to COVID-19, pre-visit patient instructions were explained to the patient and their symptoms were checked upon arrival. Masks were used by the healthcare provider and staff and the examination room was cleaned after the patient visit concluded\par \par \par \par \par

## 2022-07-21 ENCOUNTER — RESULT CHARGE (OUTPATIENT)
Age: 77
End: 2022-07-21

## 2022-07-21 ENCOUNTER — APPOINTMENT (OUTPATIENT)
Dept: CARDIOLOGY | Facility: CLINIC | Age: 77
End: 2022-07-21

## 2022-07-21 VITALS
SYSTOLIC BLOOD PRESSURE: 128 MMHG | DIASTOLIC BLOOD PRESSURE: 70 MMHG | TEMPERATURE: 97.3 F | WEIGHT: 229 LBS | HEIGHT: 70 IN | HEART RATE: 76 BPM | BODY MASS INDEX: 32.78 KG/M2

## 2022-07-21 PROCEDURE — 93000 ELECTROCARDIOGRAM COMPLETE: CPT

## 2022-07-21 PROCEDURE — 99214 OFFICE O/P EST MOD 30 MIN: CPT | Mod: 25

## 2022-08-22 ENCOUNTER — APPOINTMENT (OUTPATIENT)
Dept: PLASTIC SURGERY | Facility: CLINIC | Age: 77
End: 2022-08-22

## 2022-11-21 NOTE — H&P PST ADULT - OCCUPATION
Medicine Refill Request    Last Office: 4/28/2022   Last Consult Visit: Visit date not found  Last Telemedicine Visit: 6/11/2020 Emil Coats MD    Next Appointment: Visit date not found      Current Outpatient Medications:     aspirin 81 mg enteric coated tablet, 81 mg. Every other day, Disp: , Rfl:     famotidine (PEPCID) 20 mg tablet, Take 20 mg by mouth 2 (two) times a day. One daily, Disp: , Rfl:     finasteride (PROSCAR) 5 mg tablet, Take 5 mg by mouth daily. Taking every other day , Disp: , Rfl:     lidocaine (LIDODERM) 5 % patch, Apply 1 patch topically daily. Remove & discard patch within 12 hours or as directed by presscriber., Disp: 20 patch, Rfl: 0    metoprolol succinate XL 50 mg 24 hr tablet, Take 1 tablet (50 mg total) by mouth daily., Disp: 90 tablet, Rfl: 3    simvastatin (ZOCOR) 10 mg tablet, Take 10 mg by mouth nightly. One daily, Disp: , Rfl:     tamsulosin (FLOMAX) 0.4 mg capsule, Take 0.4 mg by mouth., Disp: , Rfl:       BP Readings from Last 3 Encounters:   07/12/22 122/76   04/28/22 (!) 142/76   01/12/22 124/80       Recent Lab results:  Lab Results   Component Value Date    CHOL 173 09/14/2021   ,   Lab Results   Component Value Date    HDL 44 (L) 09/14/2021   ,   Lab Results   Component Value Date    LDLCALC 106 (H) 09/14/2021   ,   Lab Results   Component Value Date    TRIG 115 09/14/2021        Lab Results   Component Value Date    GLUCOSE 120 (H) 11/28/2021   ,   Lab Results   Component Value Date    HGBA1C 5.8 (H) 09/14/2021         Lab Results   Component Value Date    CREATININE 1.3 11/28/2021       Lab Results   Component Value Date    TSH 3.75 11/28/2021              retired

## 2022-12-16 NOTE — ASU PATIENT PROFILE, ADULT - NS PRO MODE OF ARRIVAL
Please take 11.25mg (1 1/2 tablets) today and tomorrow then resume taking warfarin 3.75mg (1/2 tablet) Wednesdays and 7.5mg (1 tablet) all other days. Continue to monitor for signs of bleeding. Return to coumadin clinic in 6 weeks.
ambulatory

## 2023-01-01 NOTE — PHYSICAL EXAM
I discussed this case with the resident. I agree with the Resident's plan.    [General Appearance - Well Developed] : well developed [Normal Appearance] : normal appearance [Well Groomed] : well groomed [General Appearance - Well Nourished] : well nourished [No Deformities] : no deformities [General Appearance - In No Acute Distress] : no acute distress [Normal Conjunctiva] : the conjunctiva exhibited no abnormalities [Eyelids - No Xanthelasma] : the eyelids demonstrated no xanthelasmas [Normal Oral Mucosa] : normal oral mucosa [No Oral Pallor] : no oral pallor [No Oral Cyanosis] : no oral cyanosis [Normal Jugular Venous A Waves Present] : normal jugular venous A waves present [Normal Jugular Venous V Waves Present] : normal jugular venous V waves present [No Jugular Venous Skelton A Waves] : no jugular venous skelton A waves [Respiration, Rhythm And Depth] : normal respiratory rhythm and effort [Exaggerated Use Of Accessory Muscles For Inspiration] : no accessory muscle use [Auscultation Breath Sounds / Voice Sounds] : lungs were clear to auscultation bilaterally [Heart Rate And Rhythm] : heart rate and rhythm were normal [Heart Sounds] : normal S1 and S2 [Murmurs] : no murmurs present [Abdomen Soft] : soft [Abdomen Tenderness] : non-tender [Abdomen Mass (___ Cm)] : no abdominal mass palpated [Abnormal Walk] : normal gait [Gait - Sufficient For Exercise Testing] : the gait was sufficient for exercise testing [Nail Clubbing] : no clubbing of the fingernails [Cyanosis, Localized] : no localized cyanosis [Petechial Hemorrhages (___cm)] : no petechial hemorrhages [Skin Color & Pigmentation] : normal skin color and pigmentation [] : no rash [No Venous Stasis] : no venous stasis [Skin Lesions] : no skin lesions [No Skin Ulcers] : no skin ulcer [No Xanthoma] : no  xanthoma was observed [Oriented To Time, Place, And Person] : oriented to person, place, and time [Affect] : the affect was normal [Mood] : the mood was normal [No Anxiety] : not feeling anxious

## 2023-01-19 ENCOUNTER — APPOINTMENT (OUTPATIENT)
Dept: CARDIOLOGY | Facility: CLINIC | Age: 78
End: 2023-01-19
Payer: MEDICARE

## 2023-01-19 VITALS
DIASTOLIC BLOOD PRESSURE: 74 MMHG | HEART RATE: 63 BPM | TEMPERATURE: 97.7 F | WEIGHT: 227 LBS | HEIGHT: 70 IN | BODY MASS INDEX: 32.5 KG/M2 | SYSTOLIC BLOOD PRESSURE: 124 MMHG

## 2023-01-19 PROCEDURE — 99214 OFFICE O/P EST MOD 30 MIN: CPT | Mod: 25

## 2023-01-19 PROCEDURE — 93000 ELECTROCARDIOGRAM COMPLETE: CPT

## 2023-01-19 NOTE — HISTORY OF PRESENT ILLNESS
[FreeTextEntry1] : This is a 77-year-old, white male, a patient of Dr. Cueav\par The patient has a history of coronary artery disease.\par  He has a history of  angioplasty, with stents placed in the proximal and distal portion of his left circumflex system. \par This was done in July of the year 2014.\par He presently is on aggressive medical therapy. .\par  His last echocardiogram was in 2016. There was mild to moderate tricuspid regurgitation mild to moderate mitral regurgitation.  Overall ventricular ejection fraction was normal.\par nuclear exam in 2016 was normal. \par \par since the last visit, patient c/o atypical chest pain\par admits to exertional sob\par here for further follow up care and possible further cardiac testing\par denies edema, claudication, tia or other manifestations of ashd\par denies arrythmia, palps, pre-syncope or syncope\par \par 1/2021 patient had pci of lcx\par doing well\par no complications\par no chest pain or sob\par no cardiac complaints\par \par no new complaints\par will d/c plavix

## 2023-01-30 ENCOUNTER — APPOINTMENT (OUTPATIENT)
Dept: PLASTIC SURGERY | Facility: CLINIC | Age: 78
End: 2023-01-30
Payer: MEDICARE

## 2023-01-30 PROCEDURE — 99213 OFFICE O/P EST LOW 20 MIN: CPT | Mod: 25

## 2023-01-30 PROCEDURE — 20550 NJX 1 TENDON SHEATH/LIGAMENT: CPT

## 2023-01-30 NOTE — ASSESSMENT
[FreeTextEntry1] : 76 yo RHD diabetic M with right thumb and right 4th trigger finger. s/p 2 kenalog injections with total improvement in trigger symptoms. Now c/o triggering in left middle finger and thumb. \par s/p kenalog injection LMF and left thumb (7/11/22)\par \par s/p kenalog injection LMF and LRF trigger finger\par \par Recommend kenalog injection to LMF and LRF trigger finger\par \par -Pt tolerated the procedure\par -B/R/A reviewed\par -Informed consent obtained\par -Patient understands that if LMF TF recurs, will discuss definitive surgical treatment for unsuccessful conservative mgmt.\par -All questions were answered\par -Follow up in 6 weeks for TF check\par \par Due to COVID-19, pre-visit patient instructions were explained to the patient and their symptoms were checked upon arrival. Masks were used by the healthcare provider and staff and the examination room was cleaned after the patient visit concluded\par \par \par \par \par

## 2023-01-30 NOTE — HISTORY OF PRESENT ILLNESS
[FreeTextEntry1] : 76 yo M with PMH of HTN, CAD s/p cardiac stents x2, DM II and HLD who is learned RHD and presents today for evaluation of right trigger thumb and 4th finger for the past 5-6 months. Reports audible click at times and inability to extend right thumb fully with significant pain. Right 4th digit is triggering intermittently with less discomfort. Denies any hand trauma or prior steroid injections to either finger. \par \par Occupation- retired  \par Former smoker\par \par Interval hx (6/22/20):  Here for follow up after kenlog injection for trigger finger of right ring and thumb.  he reports that the right thumb no longer locks.  Improvement of frequency of triggering thumb and ring finger.  He is here today inquiring about another kenalog injection.\par \par Interval hx (7/11/22): 76 y/o male s/p 2 kenalog injections for TF of R ring finger & thumb with relief, denies any residual triggering in right fingers. Now presents  c/o triggering of left middle finger & thumb x 2 weeks with no improvement.  Here to discuss treatment for TF of LMF and L thumb.  Denies fevers, chills.\par \par Interval hx (1/20/23): Here for LMF and L thumb TF followup\par

## 2023-01-30 NOTE — PHYSICAL EXAM
Message   Recorded as Task   Date: 12/12/2017 10:01 PM, Created By: Tim Bermudez   Task Name: Call Back   Assigned To: Luis Carolina   Regarding Patient: Adrián Olsen, Status: Active   Comment:    Luis Carolina - 12 Dec 2017 10:01 PM     TASK CREATED  preop request from Dr Scotty Villanueva  see form  needs medical clearance  needs preop visit   pt needs to make sure we have all data before visit   Seble Douglas - 13 Dec 2017 3:14 PM     TASK REASSIGNED: Previously Assigned To [address]  Pt has an appt for 01/15/17 for a Pre-Op clerance, will have EKG done in our office and will go for b/e before her appt here  she is aware to make sure we get copy of labs before her appt     Luis Carolina - 13 Dec 2017 5:49 PM     TASK EDITED  ok        Signatures   Electronically signed by : Dakota Arceo DO; Dec 13 2017  5:49PM EST                       (Author) [de-identified] : well-developed male, NAD [de-identified] : NC/AT [de-identified] : PERRL [de-identified] : supple [de-identified] : good inspiratory effort [de-identified] : KATIANAR [de-identified] : softly protuberant, nontender  [de-identified] : Right hand:  thumb with improved flexion/extension, FROM, no palpable triggering at A1 in any digit\par Left hand: FROM all digit, triggering left thumb and MF, +palpalbe A1 nodule and +triggering\par

## 2023-01-30 NOTE — PROCEDURE
[FreeTextEntry1] : Left middle and ring trigger finger [FreeTextEntry2] : Left middle and ring trigger finger kenalog injection [FreeTextEntry3] : cold refirgerant [FreeTextEntry6] : The benefits, risks, and outcomes of kenalog injection were discussed. The risks include infection, hypopigmentation, poor wound healing, fat atrophy, hyperglycemia and dissatisfaction with outcome. The patient understood the risks and elected to proceed with steroid injection.\par \par The left middle and ring finger was prepared in sterile fashion. \par The injection site was identified and marked. \par Triamcinolone 5 mg was injected into each flexor tendon sheath without issue. \par The patient tolerated the procedure.\par \par total kenalog 10 mg

## 2023-02-13 ENCOUNTER — APPOINTMENT (OUTPATIENT)
Dept: CARDIOLOGY | Facility: CLINIC | Age: 78
End: 2023-02-13
Payer: MEDICARE

## 2023-02-13 PROCEDURE — 93306 TTE W/DOPPLER COMPLETE: CPT

## 2023-03-13 ENCOUNTER — APPOINTMENT (OUTPATIENT)
Dept: PLASTIC SURGERY | Facility: CLINIC | Age: 78
End: 2023-03-13

## 2023-05-23 ENCOUNTER — APPOINTMENT (OUTPATIENT)
Dept: CARDIOLOGY | Facility: CLINIC | Age: 78
End: 2023-05-23

## 2023-05-25 ENCOUNTER — APPOINTMENT (OUTPATIENT)
Dept: CARDIOLOGY | Facility: CLINIC | Age: 78
End: 2023-05-25

## 2023-07-17 ENCOUNTER — APPOINTMENT (OUTPATIENT)
Dept: PLASTIC SURGERY | Facility: CLINIC | Age: 78
End: 2023-07-17
Payer: MEDICARE

## 2023-07-17 DIAGNOSIS — M79.644 PAIN IN RIGHT FINGER(S): ICD-10-CM

## 2023-07-17 PROCEDURE — 99213 OFFICE O/P EST LOW 20 MIN: CPT | Mod: 25

## 2023-07-17 PROCEDURE — 20550 NJX 1 TENDON SHEATH/LIGAMENT: CPT

## 2023-07-17 NOTE — ASSESSMENT
[FreeTextEntry1] : 76 yo RHD diabetic M with right thumb and right 4th trigger finger. s/p 2 kenalog injections with total improvement in trigger symptoms. Now c/o triggering in left middle finger and thumb. \par s/p kenalog injection LMF and left thumb (7/11/22)\par s/p kenalog injection LMF and LRF trigger finger 1/30/23\par \par s/p kenalog injection LRF today\par \par -Pt tolerated the procedure\par -B/R/A reviewed\par -Informed consent obtained\par -Recommend BL hand x-ray r/o OA; if LMF TF symptoms persist recommend definitive surgical treatment for unsuccessful conservative mgmt.\par -All questions were answered\par -Follow up after x-ray in 6 weeks for TF check and possible surgical scheduling\par \par Due to COVID-19, pre-visit patient instructions were explained to the patient and their symptoms were checked upon arrival. Masks were used by the healthcare provider and staff and the examination room was cleaned after the patient visit concluded\par \par \par \par \par

## 2023-07-17 NOTE — PHYSICAL EXAM
[de-identified] : well-developed male, NAD [de-identified] : NC/AT [de-identified] : PERRL [de-identified] : supple [de-identified] : good inspiratory effort [de-identified] : KATIANAR [de-identified] : softly protuberant, nontender  [de-identified] : Right hand:  thumb with improved flexion/extension, FROM, no palpable triggering at A1 in any digit\par Left hand: FROM all digit, no triggering left thumb and MF, mild pain with MCP ROM, no reythema; +LRF +palpable A1 nodule and +triggering\par

## 2023-07-17 NOTE — HISTORY OF PRESENT ILLNESS
[FreeTextEntry1] : 76 yo M with PMH of HTN, CAD s/p cardiac stents x2, DM II and HLD who is learned RHD and presents today for evaluation of right trigger thumb and 4th finger for the past 5-6 months. Reports audible click at times and inability to extend right thumb fully with significant pain. Right 4th digit is triggering intermittently with less discomfort. Denies any hand trauma or prior steroid injections to either finger. \par \par Occupation- retired  \par Former smoker\par \par Interval hx (6/22/20):  Here for follow up after kenlog injection for trigger finger of right ring and thumb.  he reports that the right thumb no longer locks.  Improvement of frequency of triggering thumb and ring finger.  He is here today inquiring about another kenalog injection.\par \par Interval hx (7/11/22): 78 y/o male s/p 2 kenalog injections for TF of R ring finger & thumb with relief, denies any residual triggering in right fingers. Now presents  c/o triggering of left middle finger & thumb x 2 weeks with no improvement.  Here to discuss treatment for TF of LMF and L thumb.  Denies fevers, chills.\par \par Interval hx (1/20/23): Here for LMF stiffness and dorsal pain; and LRF triggering.  Had LMF kenalog injection x 2 already.  eHre to discuss treatment options.\par

## 2023-08-03 ENCOUNTER — OUTPATIENT (OUTPATIENT)
Dept: OUTPATIENT SERVICES | Facility: HOSPITAL | Age: 78
LOS: 1 days | End: 2023-08-03
Payer: MEDICARE

## 2023-08-03 DIAGNOSIS — I25.10 ATHEROSCLEROTIC HEART DISEASE OF NATIVE CORONARY ARTERY WITHOUT ANGINA PECTORIS: Chronic | ICD-10-CM

## 2023-08-03 DIAGNOSIS — S42.92XS FRACTURE OF LEFT SHOULDER GIRDLE, PART UNSPECIFIED, SEQUELA: Chronic | ICD-10-CM

## 2023-08-03 DIAGNOSIS — Z41.9 ENCOUNTER FOR PROCEDURE FOR PURPOSES OTHER THAN REMEDYING HEALTH STATE, UNSPECIFIED: Chronic | ICD-10-CM

## 2023-08-03 DIAGNOSIS — M79.642 PAIN IN LEFT HAND: ICD-10-CM

## 2023-08-03 DIAGNOSIS — M79.641 PAIN IN RIGHT HAND: ICD-10-CM

## 2023-08-03 PROCEDURE — 73130 X-RAY EXAM OF HAND: CPT | Mod: 50

## 2023-08-03 PROCEDURE — 73130 X-RAY EXAM OF HAND: CPT | Mod: 26,50

## 2023-08-04 DIAGNOSIS — M79.642 PAIN IN LEFT HAND: ICD-10-CM

## 2023-08-04 DIAGNOSIS — M79.641 PAIN IN RIGHT HAND: ICD-10-CM

## 2023-08-14 ENCOUNTER — NON-APPOINTMENT (OUTPATIENT)
Age: 78
End: 2023-08-14

## 2023-08-17 ENCOUNTER — APPOINTMENT (OUTPATIENT)
Dept: CARDIOLOGY | Facility: CLINIC | Age: 78
End: 2023-08-17
Payer: MEDICARE

## 2023-08-17 VITALS
SYSTOLIC BLOOD PRESSURE: 122 MMHG | WEIGHT: 228 LBS | HEART RATE: 69 BPM | DIASTOLIC BLOOD PRESSURE: 76 MMHG | BODY MASS INDEX: 32.64 KG/M2 | HEIGHT: 70 IN

## 2023-08-17 PROCEDURE — 93000 ELECTROCARDIOGRAM COMPLETE: CPT

## 2023-08-17 PROCEDURE — 99214 OFFICE O/P EST MOD 30 MIN: CPT | Mod: 25

## 2023-08-17 RX ORDER — DUTASTERIDE 0.5 MG/1
0.5 CAPSULE, LIQUID FILLED ORAL
Refills: 0 | Status: ACTIVE | COMMUNITY

## 2023-08-17 RX ORDER — SILODOSIN 8 MG/1
8 CAPSULE ORAL DAILY
Refills: 0 | Status: ACTIVE | COMMUNITY

## 2023-08-17 NOTE — ASSESSMENT
[FreeTextEntry1] : Preop for hand surgery Coronary artery disease STATUS POST CORONARY ANGIOPLASTY OF LEFT CIRCUMFLEX  CHEST DISCOMFORT-CLASS 1 No symptoms of congestive heart failure Hypertension HYPERLIPIDEMIA

## 2023-08-17 NOTE — REASON FOR VISIT
[Follow-Up - Clinic] : a clinic follow-up of [Chest Pain] : chest pain [Coronary Artery Disease] : coronary artery disease [Hypertension] : hypertension [FreeTextEntry2] : cad and s/p pci of lcx. Paatient is pre-op for hand surgery

## 2023-08-17 NOTE — DISCUSSION/SUMMARY
[FreeTextEntry1] : DC aspirin Low risk surgery No need for cardiac testing The patient has class I in terms of chest pain or shortness of breath No restrictions

## 2023-08-17 NOTE — PHYSICAL EXAM
[General Appearance - Well Developed] : well developed [Normal Appearance] : normal appearance [Well Groomed] : well groomed [General Appearance - Well Nourished] : well nourished [No Deformities] : no deformities [General Appearance - In No Acute Distress] : no acute distress [Eyelids - No Xanthelasma] : the eyelids demonstrated no xanthelasmas [Normal Oral Mucosa] : normal oral mucosa [No Oral Pallor] : no oral pallor [No Oral Cyanosis] : no oral cyanosis [Normal Jugular Venous A Waves Present] : normal jugular venous A waves present [Normal Jugular Venous V Waves Present] : normal jugular venous V waves present [No Jugular Venous Skelton A Waves] : no jugular venous skelton A waves [Respiration, Rhythm And Depth] : normal respiratory rhythm and effort [Exaggerated Use Of Accessory Muscles For Inspiration] : no accessory muscle use [Auscultation Breath Sounds / Voice Sounds] : lungs were clear to auscultation bilaterally [Heart Rate And Rhythm] : heart rate and rhythm were normal [Heart Sounds] : normal S1 and S2 [Murmurs] : no murmurs present [Abdomen Soft] : soft [Abdomen Tenderness] : non-tender [Abdomen Mass (___ Cm)] : no abdominal mass palpated [Abnormal Walk] : normal gait [Gait - Sufficient For Exercise Testing] : the gait was sufficient for exercise testing [Nail Clubbing] : no clubbing of the fingernails [Cyanosis, Localized] : no localized cyanosis [Petechial Hemorrhages (___cm)] : no petechial hemorrhages [Skin Color & Pigmentation] : normal skin color and pigmentation [] : no rash [No Venous Stasis] : no venous stasis [Skin Lesions] : no skin lesions [No Skin Ulcers] : no skin ulcer [No Xanthoma] : no  xanthoma was observed [Oriented To Time, Place, And Person] : oriented to person, place, and time [Affect] : the affect was normal [Mood] : the mood was normal [No Anxiety] : not feeling anxious [Well Developed] : well developed [Well Nourished] : well nourished [No Acute Distress] : no acute distress [Normal Conjunctiva] : normal conjunctiva [Normal Venous Pressure] : normal venous pressure [No Carotid Bruit] : no carotid bruit [Normal S1, S2] : normal S1, S2 [No Murmur] : no murmur [No Rub] : no rub [No Gallop] : no gallop [Clear Lung Fields] : clear lung fields [Good Air Entry] : good air entry [No Respiratory Distress] : no respiratory distress  [Soft] : abdomen soft [Non Tender] : non-tender [No Masses/organomegaly] : no masses/organomegaly [Normal Bowel Sounds] : normal bowel sounds [Normal Gait] : normal gait [No Edema] : no edema [No Cyanosis] : no cyanosis [No Clubbing] : no clubbing [No Varicosities] : no varicosities [No Rash] : no rash [No Skin Lesions] : no skin lesions [Moves all extremities] : moves all extremities [No Focal Deficits] : no focal deficits [Normal Speech] : normal speech [Alert and Oriented] : alert and oriented [Normal memory] : normal memory

## 2023-08-17 NOTE — HISTORY OF PRESENT ILLNESS
[FreeTextEntry1] : This is a 78-year-old, white male, a patient of Dr. Cueva The patient has a history of coronary artery disease.  He has a history of  angioplasty, with stents placed in the proximal and distal portion of his left circumflex system.  This was done in July of the year 2014. He presently is on aggressive medical therapy. .  His last echocardiogram was in 2016. There was mild to moderate tricuspid regurgitation mild to moderate mitral regurgitation.  Overall ventricular ejection fraction was normal. nuclear exam in 2016 was normal.   since the last visit, patient c/o atypical chest pain admits to exertional sob here for further follow up care and possible further cardiac testing denies edema, claudication, tia or other manifestations of ashd denies arrythmia, palps, pre-syncope or syncope  1/2021 patient had pci of lcx doing well no complications no chest pain or sob no cardiac complaints  no new complaints will d/c aspirin

## 2023-08-28 ENCOUNTER — APPOINTMENT (OUTPATIENT)
Dept: PLASTIC SURGERY | Facility: CLINIC | Age: 78
End: 2023-08-28
Payer: MEDICARE

## 2023-08-28 PROCEDURE — 99213 OFFICE O/P EST LOW 20 MIN: CPT

## 2023-08-28 NOTE — DATA REVIEWED
[FreeTextEntry1] : ACC: [id number]     EXAM:  XR HAND MIN 3 VIEWS BI   ORDERED BY: RONALDO FLORES  PROCEDURE DATE:  08/03/2023    INTERPRETATION:  INDICATION: Third metacarpophalangeal joint pain  COMPARISON: None.  TECHNIQUE: 3 views of each hand.  FINDINGS/ IMPRESSION:  RIGHT: There is no evidence of acute fracture or dislocation. No osseous erosions are seen.  There is moderate-severe degenerative change of the middle finger metacarpophalangeal joint and STT joint. There is mild-moderate degenerative change of the index finger metacarpophalangeal joint and thumb interphalangeal joint.  LEFT: There is no evidence of acute fracture or dislocation. No osseous erosions are seen.  There is moderate degenerative change of the middle finger metacarpophalangeal joint and mild degenerative change of the index finger metacarpophalangeal joint, STT joint, and interphalangeal, metacarpophalangeal, and basal joints of the thumb.   --- End of Report ---      JUANJO KANG MD; Attending Radiologist This document has been electronically signed. Aug  4 2023 11:42AM

## 2023-08-28 NOTE — PHYSICAL EXAM
[de-identified] : well-developed male, NAD [de-identified] : NC/AT [de-identified] : PERRL [de-identified] : supple [de-identified] : good inspiratory effort [de-identified] : KATIANAR [de-identified] : softly protuberant, nontender  [de-identified] : Right hand:  thumb with improved flexion/extension, FROM, no palpable triggering at A1 in any digit Left hand: FROM all digit, + triggering left thumb and MF and A1 pulley nodule tenderness with MCP ROM, no reythema; +LRF +palpable A1 nodule and +triggering

## 2023-08-28 NOTE — ASSESSMENT
[FreeTextEntry1] : 76 yo RHD diabetic M with right thumb and right 4th trigger finger. s/p 2 kenalog injections with total improvement in trigger symptoms. Now c/o triggering in left middle finger and thumb.  s/p kenalog injection LMF and left thumb (7/11/22) s/p kenalog injection LMF and LRF trigger finger 1/30/23, 7/2023  Recommend trigger finger release of LMF, LRF and thumb in ROSE MARY under IV sedation  - BL hand x-ray reviewed - OA STT joint - benefits, risks and alternatives were reviewed in detail. - Hold ASA 81 mg 1 week before surgery. - Dr. Jimenez PMD to provide medical clearance - All questions were answered.  Informed consent was obtained - Will schedule for ROSE MARY procedure

## 2023-08-28 NOTE — HISTORY OF PRESENT ILLNESS
[FreeTextEntry1] : 76 yo M with PMH of HTN, CAD s/p cardiac stents x2, DM II and HLD who is learned RHD and presents today for evaluation of right trigger thumb and 4th finger for the past 5-6 months. Reports audible click at times and inability to extend right thumb fully with significant pain. Right 4th digit is triggering intermittently with less discomfort. Denies any hand trauma or prior steroid injections to either finger.   Occupation- retired   Former smoker  Interval hx (6/22/20):  Here for follow up after kenlog injection for trigger finger of right ring and thumb.  he reports that the right thumb no longer locks.  Improvement of frequency of triggering thumb and ring finger.  He is here today inquiring about another kenalog injection.  Interval hx (7/11/22): 76 y/o male s/p 2 kenalog injections for TF of R ring finger & thumb with relief, denies any residual triggering in right fingers. Now presents  c/o triggering of left middle finger & thumb x 2 weeks with no improvement.  Here to discuss treatment for TF of LMF and L thumb.  Denies fevers, chills.  Interval hx (1/20/23): Here for LMF stiffness and dorsal pain; and LRF triggering.  Had LMF kenalog injection x 2 already.  eHre to discuss treatment options.  Interval hx (8/28/23). Pt here for f/u to discuss X-Ray results. and discuss treatment option for TF.  LMF with persisten triggering.  left thumb with pain at A1 pulley. Also LRF A1 pulley pain and tenderness.  He has had prior kenalog injectionx 2 to each of the affected digits.

## 2023-09-11 ENCOUNTER — OUTPATIENT (OUTPATIENT)
Dept: OUTPATIENT SERVICES | Facility: HOSPITAL | Age: 78
LOS: 1 days | End: 2023-09-11
Payer: MEDICARE

## 2023-09-11 ENCOUNTER — RESULT REVIEW (OUTPATIENT)
Age: 78
End: 2023-09-11

## 2023-09-11 VITALS
RESPIRATION RATE: 18 BRPM | OXYGEN SATURATION: 96 % | DIASTOLIC BLOOD PRESSURE: 86 MMHG | HEART RATE: 56 BPM | WEIGHT: 223.99 LBS | TEMPERATURE: 99 F | SYSTOLIC BLOOD PRESSURE: 138 MMHG | HEIGHT: 70 IN

## 2023-09-11 DIAGNOSIS — I25.10 ATHEROSCLEROTIC HEART DISEASE OF NATIVE CORONARY ARTERY WITHOUT ANGINA PECTORIS: Chronic | ICD-10-CM

## 2023-09-11 DIAGNOSIS — Z01.818 ENCOUNTER FOR OTHER PREPROCEDURAL EXAMINATION: ICD-10-CM

## 2023-09-11 DIAGNOSIS — M65.312 TRIGGER THUMB, LEFT THUMB: ICD-10-CM

## 2023-09-11 DIAGNOSIS — Z41.9 ENCOUNTER FOR PROCEDURE FOR PURPOSES OTHER THAN REMEDYING HEALTH STATE, UNSPECIFIED: Chronic | ICD-10-CM

## 2023-09-11 DIAGNOSIS — S42.92XS FRACTURE OF LEFT SHOULDER GIRDLE, PART UNSPECIFIED, SEQUELA: Chronic | ICD-10-CM

## 2023-09-11 LAB
A1C WITH ESTIMATED AVERAGE GLUCOSE RESULT: 6.8 % — HIGH (ref 4–5.6)
ALBUMIN SERPL ELPH-MCNC: 4.4 G/DL — SIGNIFICANT CHANGE UP (ref 3.5–5.2)
ALP SERPL-CCNC: 77 U/L — SIGNIFICANT CHANGE UP (ref 30–115)
ALT FLD-CCNC: 13 U/L — SIGNIFICANT CHANGE UP (ref 0–41)
ANION GAP SERPL CALC-SCNC: 13 MMOL/L — SIGNIFICANT CHANGE UP (ref 7–14)
APTT BLD: 31.7 SEC — SIGNIFICANT CHANGE UP (ref 27–39.2)
AST SERPL-CCNC: 15 U/L — SIGNIFICANT CHANGE UP (ref 0–41)
BASOPHILS # BLD AUTO: 0.05 K/UL — SIGNIFICANT CHANGE UP (ref 0–0.2)
BASOPHILS NFR BLD AUTO: 0.7 % — SIGNIFICANT CHANGE UP (ref 0–1)
BILIRUB SERPL-MCNC: 0.9 MG/DL — SIGNIFICANT CHANGE UP (ref 0.2–1.2)
BUN SERPL-MCNC: 15 MG/DL — SIGNIFICANT CHANGE UP (ref 10–20)
CALCIUM SERPL-MCNC: 9.3 MG/DL — SIGNIFICANT CHANGE UP (ref 8.4–10.5)
CHLORIDE SERPL-SCNC: 100 MMOL/L — SIGNIFICANT CHANGE UP (ref 98–110)
CO2 SERPL-SCNC: 25 MMOL/L — SIGNIFICANT CHANGE UP (ref 17–32)
CREAT SERPL-MCNC: 0.9 MG/DL — SIGNIFICANT CHANGE UP (ref 0.7–1.5)
EGFR: 88 ML/MIN/1.73M2 — SIGNIFICANT CHANGE UP
EOSINOPHIL # BLD AUTO: 0.28 K/UL — SIGNIFICANT CHANGE UP (ref 0–0.7)
EOSINOPHIL NFR BLD AUTO: 3.8 % — SIGNIFICANT CHANGE UP (ref 0–8)
ESTIMATED AVERAGE GLUCOSE: 148 MG/DL — HIGH (ref 68–114)
GLUCOSE SERPL-MCNC: 82 MG/DL — SIGNIFICANT CHANGE UP (ref 70–99)
HCT VFR BLD CALC: 40.6 % — LOW (ref 42–52)
HGB BLD-MCNC: 13.7 G/DL — LOW (ref 14–18)
IMM GRANULOCYTES NFR BLD AUTO: 0.3 % — SIGNIFICANT CHANGE UP (ref 0.1–0.3)
INR BLD: 1.16 RATIO — SIGNIFICANT CHANGE UP (ref 0.65–1.3)
LYMPHOCYTES # BLD AUTO: 1.31 K/UL — SIGNIFICANT CHANGE UP (ref 1.2–3.4)
LYMPHOCYTES # BLD AUTO: 17.8 % — LOW (ref 20.5–51.1)
MCHC RBC-ENTMCNC: 30 PG — SIGNIFICANT CHANGE UP (ref 27–31)
MCHC RBC-ENTMCNC: 33.7 G/DL — SIGNIFICANT CHANGE UP (ref 32–37)
MCV RBC AUTO: 88.8 FL — SIGNIFICANT CHANGE UP (ref 80–94)
MONOCYTES # BLD AUTO: 0.79 K/UL — HIGH (ref 0.1–0.6)
MONOCYTES NFR BLD AUTO: 10.7 % — HIGH (ref 1.7–9.3)
NEUTROPHILS # BLD AUTO: 4.9 K/UL — SIGNIFICANT CHANGE UP (ref 1.4–6.5)
NEUTROPHILS NFR BLD AUTO: 66.7 % — SIGNIFICANT CHANGE UP (ref 42.2–75.2)
NRBC # BLD: 0 /100 WBCS — SIGNIFICANT CHANGE UP (ref 0–0)
PLATELET # BLD AUTO: 270 K/UL — SIGNIFICANT CHANGE UP (ref 130–400)
PMV BLD: 9.4 FL — SIGNIFICANT CHANGE UP (ref 7.4–10.4)
POTASSIUM SERPL-MCNC: 4.4 MMOL/L — SIGNIFICANT CHANGE UP (ref 3.5–5)
POTASSIUM SERPL-SCNC: 4.4 MMOL/L — SIGNIFICANT CHANGE UP (ref 3.5–5)
PROT SERPL-MCNC: 7.5 G/DL — SIGNIFICANT CHANGE UP (ref 6–8)
PROTHROM AB SERPL-ACNC: 13.3 SEC — HIGH (ref 9.95–12.87)
RBC # BLD: 4.57 M/UL — LOW (ref 4.7–6.1)
RBC # FLD: 13.2 % — SIGNIFICANT CHANGE UP (ref 11.5–14.5)
SODIUM SERPL-SCNC: 138 MMOL/L — SIGNIFICANT CHANGE UP (ref 135–146)
WBC # BLD: 7.35 K/UL — SIGNIFICANT CHANGE UP (ref 4.8–10.8)
WBC # FLD AUTO: 7.35 K/UL — SIGNIFICANT CHANGE UP (ref 4.8–10.8)

## 2023-09-11 PROCEDURE — 85730 THROMBOPLASTIN TIME PARTIAL: CPT

## 2023-09-11 PROCEDURE — 80053 COMPREHEN METABOLIC PANEL: CPT

## 2023-09-11 PROCEDURE — 99214 OFFICE O/P EST MOD 30 MIN: CPT | Mod: 25

## 2023-09-11 PROCEDURE — 85610 PROTHROMBIN TIME: CPT

## 2023-09-11 PROCEDURE — 85025 COMPLETE CBC W/AUTO DIFF WBC: CPT

## 2023-09-11 PROCEDURE — 36415 COLL VENOUS BLD VENIPUNCTURE: CPT

## 2023-09-11 PROCEDURE — 71046 X-RAY EXAM CHEST 2 VIEWS: CPT

## 2023-09-11 PROCEDURE — 71046 X-RAY EXAM CHEST 2 VIEWS: CPT | Mod: 26

## 2023-09-11 PROCEDURE — 83036 HEMOGLOBIN GLYCOSYLATED A1C: CPT

## 2023-09-11 RX ORDER — FINASTERIDE 5 MG/1
1 TABLET, FILM COATED ORAL
Qty: 0 | Refills: 0 | DISCHARGE

## 2023-09-11 NOTE — H&P PST ADULT - HISTORY OF PRESENT ILLNESS
PATIENT CURRENTLY DENIES CHEST PAIN  SHORTNESS OF BREATH  PALPITATIONS,  DYSURIA, OR UPPER RESPIRATORY INFECTION IN PAST 2 WEEKS  EXERCISE  TOLERANCE  1-2 FLIGHT OF STAIRS  WITHOUT SHORTNESS OF BREATH  denies travel outside the [place] in the past 30 days  Patient denies any signs or symptoms of COVID 19 and denies contact with known positive individuals.  They have an appointment for repeat COVID testing pre-procedure and acknowledge its time and place.  They were instructed to quarantine pre-procedure, practice exposure control measures, continue to self-monitor and report any concerns to their proceduralist.  pt advised self quarantine till day of procedure  Anesthesia Alert  NO--Difficult Airway  NO--History of neck surgery or radiation  NO--Limited ROM of neck  NO--History of Malignant hyperthermia  NO--No personal or family history of Pseudocholinesterase deficiency.  NO--Prior Anesthesia Complication  NO--Latex Allergy  NO--Loose teeth  NO--History of Rheumatoid Arthritis  Bleeding risk asa  NO--TRISTA  NO--Other_____    PT DENIES ANY RASHES, ABRASION, OR OPEN WOUNDS OR CUTS    AS PER THE PT, THIS IS HIS/HER COMPLETE MEDICAL AND SURGICAL HX, INCLUDING MEDICATIONS PRESCRIBED AND OVER THE COUNTER    Patient verbalized understanding of instructions and was given the opportunity to ask questions and have them answered.    pt denies any suicidal ideation or thoughts, pt states not a threat to self or others  Trigger finger of left thumb    Encounter for other preprocedural examination    No pertinent family history in first degree relatives    CAD (coronary artery disease) of bypass graft (Father)    CAD (coronary artery disease)    HTN (hypertension)    Hypercholesteremia    DM (diabetes mellitus)    Coronary arteriosclerosis after percutaneous transluminal coronary angioplasty (PTCA)    Elective surgery    Closed fracture of left shoulder, sequela    [id number]    Revised Cardiac Risk Index for Pre-Operative Risk from [employer]  on 9/11/2023  RESULT SUMMARY:  1 points  Class II Risk    6.0 %  30-day risk of death, MI, or cardiac arrest    From [place] 2017, based on pooled data from 5 high quality external validations (4 prospective). These numbers are higher than those often quoted from the now-outdated original study (Jaden 1999). See Evidence for details.      INPUTS:  Elevated-risk surgery —> 0 = No  History of ischemic heart disease —> 1 = Yes  History of congestive heart failure —> 0 = No  History of cerebrovascular disease —> 0 = No  Pre-operative treatment with insulin —> 0 = No  Pre-operative creatinine >2 mg/dL / 176.8 µmol/L —> 0 = No    Duke Activity Status Index (DASI) from [email or web address]  on 9/11/2023    RESULT SUMMARY:  58.2 points  The higher the score (maximum 58.2), the higher the functional status.    9.89 METs        INPUTS:  Take care of self —> 2.75 = Yes  Walk indoors —> 1.75 = Yes  Walk 1&ndash;2 blocks on level ground —> 2.75 = Yes  Climb a flight of stairs or walk up a hill —> 5.5 = Yes  Run a short distance —> 8 = Yes  Do light work around the house —> 2.7 = Yes  Do moderate work around the house —> 3.5 = Yes  Do heavy work around the house —> 8 = Yes  Do yardwork —> 4.5 = Yes  Have sexual relations —> 5.25 = Yes  Participate in moderate recreational activities —> 6 = Yes  Participate in strenuous sports —> 7.5 = Yes

## 2023-09-11 NOTE — H&P PST ADULT - NSICDXPASTSURGICALHX_GEN_ALL_CORE_FT
PAST SURGICAL HISTORY:  Closed fracture of left shoulder, sequela Dislocation    Coronary arteriosclerosis after percutaneous transluminal coronary angioplasty (PTCA) 2 Stents 07/1/2014    Elective surgery Cholecystectomy 1 yr ago

## 2023-09-11 NOTE — H&P PST ADULT - NSICDXPASTMEDICALHX_GEN_ALL_CORE_FT
PAST MEDICAL HISTORY:  CAD (coronary artery disease) PCI with 2 stents    DM (diabetes mellitus)     HTN (hypertension)     Hypercholesteremia

## 2023-09-11 NOTE — H&P PST ADULT - REASON FOR ADMISSION
Patient is a  78 year old  male presenting to PAST in preparation for LEFT THUMB, MIDDLE ,AND RING FINGER TRIGGER FINGER RELEASE   on   9/21/23 under lsb anesthesia by Dr. vivas

## 2023-09-12 DIAGNOSIS — M65.312 TRIGGER THUMB, LEFT THUMB: ICD-10-CM

## 2023-09-12 DIAGNOSIS — Z01.818 ENCOUNTER FOR OTHER PREPROCEDURAL EXAMINATION: ICD-10-CM

## 2023-09-21 ENCOUNTER — TRANSCRIPTION ENCOUNTER (OUTPATIENT)
Age: 78
End: 2023-09-21

## 2023-09-21 ENCOUNTER — APPOINTMENT (OUTPATIENT)
Dept: PLASTIC SURGERY | Facility: AMBULATORY SURGERY CENTER | Age: 78
End: 2023-09-21
Payer: MEDICARE

## 2023-09-21 ENCOUNTER — OUTPATIENT (OUTPATIENT)
Dept: OUTPATIENT SERVICES | Facility: HOSPITAL | Age: 78
LOS: 1 days | Discharge: HOME | End: 2023-09-21
Payer: MEDICARE

## 2023-09-21 VITALS
TEMPERATURE: 98 F | SYSTOLIC BLOOD PRESSURE: 135 MMHG | RESPIRATION RATE: 18 BRPM | HEIGHT: 70 IN | HEART RATE: 62 BPM | WEIGHT: 223.99 LBS | DIASTOLIC BLOOD PRESSURE: 76 MMHG | OXYGEN SATURATION: 96 %

## 2023-09-21 VITALS
SYSTOLIC BLOOD PRESSURE: 115 MMHG | OXYGEN SATURATION: 96 % | HEART RATE: 63 BPM | RESPIRATION RATE: 18 BRPM | DIASTOLIC BLOOD PRESSURE: 69 MMHG

## 2023-09-21 DIAGNOSIS — Z41.9 ENCOUNTER FOR PROCEDURE FOR PURPOSES OTHER THAN REMEDYING HEALTH STATE, UNSPECIFIED: Chronic | ICD-10-CM

## 2023-09-21 DIAGNOSIS — I25.10 ATHEROSCLEROTIC HEART DISEASE OF NATIVE CORONARY ARTERY WITHOUT ANGINA PECTORIS: Chronic | ICD-10-CM

## 2023-09-21 DIAGNOSIS — M65.332 TRIGGER FINGER, LEFT MIDDLE FINGER: ICD-10-CM

## 2023-09-21 DIAGNOSIS — S42.92XS FRACTURE OF LEFT SHOULDER GIRDLE, PART UNSPECIFIED, SEQUELA: Chronic | ICD-10-CM

## 2023-09-21 DIAGNOSIS — M65.312 TRIGGER THUMB, LEFT THUMB: ICD-10-CM

## 2023-09-21 PROCEDURE — 26055 INCISE FINGER TENDON SHEATH: CPT | Mod: FA,F2,F3

## 2023-09-21 RX ORDER — SILODOSIN 4 MG/1
1 CAPSULE ORAL
Refills: 0 | DISCHARGE

## 2023-09-21 RX ORDER — ACETAMINOPHEN 500 MG
1000 TABLET ORAL ONCE
Refills: 0 | Status: DISCONTINUED | OUTPATIENT
Start: 2023-09-21 | End: 2023-09-21

## 2023-09-21 RX ORDER — TRAMADOL HYDROCHLORIDE 50 MG/1
1 TABLET ORAL
Qty: 12 | Refills: 0
Start: 2023-09-21 | End: 2023-09-23

## 2023-09-21 RX ORDER — HYDROMORPHONE HYDROCHLORIDE 2 MG/ML
0.5 INJECTION INTRAMUSCULAR; INTRAVENOUS; SUBCUTANEOUS
Refills: 0 | Status: DISCONTINUED | OUTPATIENT
Start: 2023-09-21 | End: 2023-09-21

## 2023-09-21 RX ORDER — HYDROMORPHONE HYDROCHLORIDE 2 MG/ML
1 INJECTION INTRAMUSCULAR; INTRAVENOUS; SUBCUTANEOUS ONCE
Refills: 0 | Status: DISCONTINUED | OUTPATIENT
Start: 2023-09-21 | End: 2023-09-21

## 2023-09-21 RX ORDER — CARVEDILOL PHOSPHATE 80 MG/1
1 CAPSULE, EXTENDED RELEASE ORAL
Qty: 0 | Refills: 0 | DISCHARGE

## 2023-09-21 RX ORDER — ASPIRIN/CALCIUM CARB/MAGNESIUM 324 MG
1 TABLET ORAL
Refills: 0 | DISCHARGE

## 2023-09-21 RX ORDER — CHOLECALCIFEROL (VITAMIN D3) 125 MCG
1 CAPSULE ORAL
Qty: 0 | Refills: 0 | DISCHARGE

## 2023-09-21 RX ORDER — ATORVASTATIN CALCIUM 80 MG/1
1 TABLET, FILM COATED ORAL
Qty: 0 | Refills: 0 | DISCHARGE

## 2023-09-21 RX ORDER — METFORMIN HYDROCHLORIDE 850 MG/1
1 TABLET ORAL
Qty: 0 | Refills: 0 | DISCHARGE

## 2023-09-21 RX ORDER — DUTASTERIDE 0.5 MG/1
1 CAPSULE, LIQUID FILLED ORAL
Refills: 0 | DISCHARGE

## 2023-09-21 RX ORDER — SODIUM CHLORIDE 9 MG/ML
500 INJECTION, SOLUTION INTRAVENOUS
Refills: 0 | Status: DISCONTINUED | OUTPATIENT
Start: 2023-09-21 | End: 2023-09-21

## 2023-09-21 RX ORDER — HEPARIN SODIUM 5000 [USP'U]/ML
5000 INJECTION INTRAVENOUS; SUBCUTANEOUS ONCE
Refills: 0 | Status: COMPLETED | OUTPATIENT
Start: 2023-09-21 | End: 2023-09-21

## 2023-09-21 RX ORDER — GABAPENTIN 400 MG/1
1 CAPSULE ORAL
Qty: 10 | Refills: 0
Start: 2023-09-21 | End: 2023-09-25

## 2023-09-21 RX ADMIN — SODIUM CHLORIDE 100 MILLILITER(S): 9 INJECTION, SOLUTION INTRAVENOUS at 08:45

## 2023-09-21 RX ADMIN — HEPARIN SODIUM 5000 UNIT(S): 5000 INJECTION INTRAVENOUS; SUBCUTANEOUS at 07:18

## 2023-09-21 NOTE — ASU DISCHARGE PLAN (ADULT/PEDIATRIC) - CARE PROVIDER_API CALL
Jamaica Jeong  Plastic Surgery  [address] [place] [address], [address]  [place] [address]  Phone: [phone]  Fax: [phone]  Established Patient  Follow Up Time: 1 week

## 2023-09-21 NOTE — ASU DISCHARGE PLAN (ADULT/PEDIATRIC) - ASU DC SPECIAL INSTRUCTIONSFT
Please follow up with Dr. Jeong. Please call her office at the number provided within 48 hours of leaving the hospital to set up this appointment.     Keep your hand elevated with your fingers pointing towards the ceiling as much as possible    Pain meds:   - Gabapentin twice daily  - Extra strength tylenol routinely. Please do not exceed 4,000mg in one day.   - Tramadol every 6 to 8 hours or as needed for severe pain     No heavy lifting / No using your left hand     Do not get the bandage wet

## 2023-09-21 NOTE — BRIEF OPERATIVE NOTE - NSICDXBRIEFPROCEDURE_GEN_ALL_CORE_FT
PROCEDURES:  Release of trigger thumb 21-Sep-2023 08:40:39  Maggie Torres  Release of trigger finger of left middle finger 21-Sep-2023 08:40:47  Maggie Torres  Release of trigger finger of left ring finger 21-Sep-2023 08:40:54  Maggie Torres

## 2023-09-21 NOTE — PRE-ANESTHESIA EVALUATION ADULT - NSANTHGENDERRD_ENT_A_CORE
HEPATOLOGY FOLLOW UP    Referring Physician:Shama Mccoy MD   Current Corresponding Physician: Shama Mccoy MD     Reason for Consultation: Consultation for evaluation of Cirrhosis and Hepatitis C    History of Present Illness: Karen Villarreal is a 52 y.o. femalewho presents for evaluation of   Chief Complaint   Patient presents with    Cirrhosis    Hepatitis C     Ms. Villarreal is a 53 yo female here for f/u of chronic HCV w/ cirrhosis.   HCV dx in 1999, d/t lack of insurance did not have proper f/u.  Treatment experience, INF/RBV, no response  HCV quant ~6900 (outside 3/2017)   Source is unknown. Denies IVDU, intranasal drug use, tattoos, blood transfusions  Presented to the ER with decompensation of her liver disease in the form of ascites ~ 5 months ago.  Improved w/ diuretics.   No c/o jaundice, confusion or slowed mentation, hematemesis, abdominal pain or distension, melena, edema.  Her LFTs are mildly elevated with last calculated MELD of 9(2/2017)  Her liver disease is c/b splenomegaly w/ thrombocytopenia, hypoalbuminemia, ascites  She has immunity to hepatitis A. Hepatitis B sAb and cAb negative  (Outside labs 3/2017)   HCV quant 6900, GT 1a    US 2/16:   1.  Cirrhosis.  2.  Mild splenomegaly.  3.  Small volume ascites.  4.  Cholelithiasis.  5.  Small complex cyst left kidney    Other noted hx: GERD, HTN  Past Medical History:   Diagnosis Date    Acid reflux     Hepatitis C     Hiatal hernia      Outpatient Encounter Prescriptions as of 7/19/2017   Medication Sig Dispense Refill    aluminum & magnesium hydroxide-simethicone (MAALOX MAXIMUM STRENGTH) 400-400-40 mg/5 mL suspension Take 15 mLs by mouth every 6 (six) hours as needed for Indigestion. 335 mL 0    furosemide (LASIX) 40 MG tablet Take 40 mg by mouth once daily.  3    spironolactone (ALDACTONE) 100 MG tablet Take 100 mg by mouth once daily.  3    pantoprazole (PROTONIX) 40 MG tablet Take 1 tablet (40 mg total) by  mouth once daily. 30 tablet 2    [DISCONTINUED] pantoprazole (PROTONIX) 40 MG tablet Take 1 tablet (40 mg total) by mouth once daily. 30 tablet 0     No facility-administered encounter medications on file as of 7/19/2017.      Review of patient's allergies indicates:  No Known Allergies  No family history on file.    Social History     Social History    Marital status: Single     Spouse name: N/A    Number of children: N/A    Years of education: N/A     Occupational History    Not on file.     Social History Main Topics    Smoking status: Current Some Day Smoker     Types: Cigarettes    Smokeless tobacco: Not on file    Alcohol use No    Drug use: No    Sexual activity: Not on file     Other Topics Concern    Not on file     Social History Narrative    No narrative on file     Review of Systems   Constitutional: Negative for activity change, appetite change, chills, diaphoresis, fatigue, fever and unexpected weight change.   HENT: Negative for facial swelling and nosebleeds.    Respiratory: Negative for cough, chest tightness and shortness of breath.    Cardiovascular: Negative for chest pain, palpitations and leg swelling.   Gastrointestinal: Negative for abdominal distention, abdominal pain, blood in stool, constipation, diarrhea, nausea and vomiting.   Musculoskeletal: Negative for neck pain and neck stiffness.   Skin: Negative for color change, pallor and rash.   Neurological: Negative for dizziness, tremors, syncope, weakness, light-headedness and headaches.   Hematological: Negative for adenopathy. Does not bruise/bleed easily.   Psychiatric/Behavioral: Negative for agitation, behavioral problems, confusion and decreased concentration.     Vitals:    07/19/17 1002   BP: (!) 147/70   Pulse: (!) 111   Resp: 18   Temp: 98.3 °F (36.8 °C)       Physical Exam   Constitutional: She is oriented to person, place, and time. She appears well-developed and well-nourished. No distress.   HENT:   Head:  Normocephalic and atraumatic.   Eyes: Conjunctivae are normal. Pupils are equal, round, and reactive to light. No scleral icterus.   Neck: Normal range of motion. Neck supple.   Cardiovascular: Normal rate.    Pulmonary/Chest: Effort normal.   Abdominal: Soft. She exhibits no distension and no mass. There is no tenderness. There is no rebound and no guarding.   Musculoskeletal: Normal range of motion.   Very mild pitting edema to bilateral ankles   Neurological: She is alert and oriented to person, place, and time. No cranial nerve deficit. She exhibits normal muscle tone. Coordination normal.   No asterixis   Skin: Skin is warm and dry. No rash noted. She is not diaphoretic. No erythema. No pallor.   Psychiatric: She has a normal mood and affect. Her behavior is normal. Judgment and thought content normal.       MELD-Na score: 9 at 2/16/2017  5:06 AM  MELD score: 9 at 2/16/2017  5:06 AM  Calculated from:  Serum Creatinine: 0.8 mg/dL (Rounded to 1) at 2/16/2017  5:06 AM  Serum Sodium: 137 mmol/L at 2/16/2017  5:06 AM  Total Bilirubin: 1.3 mg/dL at 2/16/2017  5:06 AM  INR(ratio): 1.2 at 2/16/2017  5:06 AM  Age: 51 years    Lab Results   Component Value Date     (H) 02/16/2017    BUN 8 02/16/2017    CREATININE 0.8 02/16/2017    CALCIUM 7.9 (L) 02/16/2017     02/16/2017    K 3.2 (L) 02/16/2017     02/16/2017    PROT 6.1 02/16/2017    CO2 23 02/16/2017    ANIONGAP 10 02/16/2017    WBC 6.20 02/16/2017    RBC 2.95 (L) 02/16/2017    HGB 9.0 (L) 02/16/2017    HCT 28.4 (L) 02/16/2017    MCV 96 02/16/2017    MCH 30.3 02/16/2017    MCHC 31.6 (L) 02/16/2017     Lab Results   Component Value Date    RDW 17.5 (H) 02/16/2017    PLT 96 (L) 02/16/2017    MPV 9.2 02/16/2017    GRAN 5.0 02/16/2017    GRAN 80.9 (H) 02/16/2017    LYMPH 0.7 (L) 02/16/2017    LYMPH 10.8 (L) 02/16/2017    MONO 0.4 02/16/2017    MONO 7.2 02/16/2017    EOSINOPHIL 0.5 02/16/2017    BASOPHIL 0.6 02/16/2017    EOS 0.0 02/16/2017    BASO 0.00  02/16/2017    APTT 26.4 02/16/2017    BNP 39 02/16/2017    ALBUMIN 2.5 (L) 02/16/2017    AST 44 (H) 02/16/2017    ALT 22 02/16/2017    ALKPHOS 190 (H) 02/16/2017    LABPROT 12.2 02/16/2017    INR 1.2 02/16/2017       Assessment and Plan:    HCV cirrhosis: improvement in decompensation, will update labs today  -recommend hepatitis B vaccination  -treatment experienced, non responder, recommend referral for re-treatment evaluation pending labs  EV screeing: needs initial EGD scheduled, patient would like to schedule locally  HCC surveillance: imaging in August, AFP in January  Ascites: controlled, no changes to diuretics  -continue low Na + diet, handout provided  GERD: provided refill for 3 months supply, recommend GI f/u      Patient Active Problem List   Diagnosis    Morbid obesity    Chronic hepatitis C with cirrhosis    Essential hypertension    Ascites     Karen Smallsham is a 52 y.o. female withCirrhosis and Hepatitis C         Yes

## 2023-09-26 DIAGNOSIS — I10 ESSENTIAL (PRIMARY) HYPERTENSION: ICD-10-CM

## 2023-09-26 DIAGNOSIS — M65.332 TRIGGER FINGER, LEFT MIDDLE FINGER: ICD-10-CM

## 2023-09-26 DIAGNOSIS — M65.312 TRIGGER THUMB, LEFT THUMB: ICD-10-CM

## 2023-09-26 DIAGNOSIS — Z98.61 CORONARY ANGIOPLASTY STATUS: ICD-10-CM

## 2023-09-26 DIAGNOSIS — I25.10 ATHEROSCLEROTIC HEART DISEASE OF NATIVE CORONARY ARTERY WITHOUT ANGINA PECTORIS: ICD-10-CM

## 2023-09-26 DIAGNOSIS — E11.9 TYPE 2 DIABETES MELLITUS WITHOUT COMPLICATIONS: ICD-10-CM

## 2023-09-26 DIAGNOSIS — E78.00 PURE HYPERCHOLESTEROLEMIA, UNSPECIFIED: ICD-10-CM

## 2023-09-26 DIAGNOSIS — Z79.84 LONG TERM (CURRENT) USE OF ORAL HYPOGLYCEMIC DRUGS: ICD-10-CM

## 2023-09-26 DIAGNOSIS — M65.342 TRIGGER FINGER, LEFT RING FINGER: ICD-10-CM

## 2023-09-28 ENCOUNTER — APPOINTMENT (OUTPATIENT)
Dept: PLASTIC SURGERY | Facility: CLINIC | Age: 78
End: 2023-09-28
Payer: MEDICARE

## 2023-09-28 PROCEDURE — 99024 POSTOP FOLLOW-UP VISIT: CPT

## 2023-10-05 ENCOUNTER — APPOINTMENT (OUTPATIENT)
Dept: PLASTIC SURGERY | Facility: CLINIC | Age: 78
End: 2023-10-05
Payer: MEDICARE

## 2023-10-05 DIAGNOSIS — M65.319 TRIGGER THUMB, UNSPECIFIED THUMB: ICD-10-CM

## 2023-10-05 DIAGNOSIS — M65.332 TRIGGER FINGER, LEFT MIDDLE FINGER: ICD-10-CM

## 2023-10-05 PROCEDURE — 99024 POSTOP FOLLOW-UP VISIT: CPT

## 2023-10-19 ENCOUNTER — APPOINTMENT (OUTPATIENT)
Dept: PLASTIC SURGERY | Facility: CLINIC | Age: 78
End: 2023-10-19
Payer: MEDICARE

## 2023-10-19 DIAGNOSIS — M65.342 TRIGGER FINGER, LEFT RING FINGER: ICD-10-CM

## 2023-10-19 DIAGNOSIS — M65.341 TRIGGER FINGER, RIGHT RING FINGER: ICD-10-CM

## 2023-10-19 DIAGNOSIS — M65.312 TRIGGER THUMB, LEFT THUMB: ICD-10-CM

## 2023-10-19 PROCEDURE — 99024 POSTOP FOLLOW-UP VISIT: CPT

## 2023-10-25 ENCOUNTER — OUTPATIENT (OUTPATIENT)
Dept: OUTPATIENT SERVICES | Facility: HOSPITAL | Age: 78
LOS: 1 days | End: 2023-10-25
Payer: MEDICARE

## 2023-10-25 DIAGNOSIS — I25.10 ATHEROSCLEROTIC HEART DISEASE OF NATIVE CORONARY ARTERY WITHOUT ANGINA PECTORIS: Chronic | ICD-10-CM

## 2023-10-25 DIAGNOSIS — Z41.9 ENCOUNTER FOR PROCEDURE FOR PURPOSES OTHER THAN REMEDYING HEALTH STATE, UNSPECIFIED: Chronic | ICD-10-CM

## 2023-10-25 DIAGNOSIS — Z00.8 ENCOUNTER FOR OTHER GENERAL EXAMINATION: ICD-10-CM

## 2023-10-25 DIAGNOSIS — S42.92XS FRACTURE OF LEFT SHOULDER GIRDLE, PART UNSPECIFIED, SEQUELA: Chronic | ICD-10-CM

## 2023-10-25 PROCEDURE — 97760 ORTHOTIC MGMT&TRAING 1ST ENC: CPT | Mod: GO

## 2023-10-25 PROCEDURE — 97165 OT EVAL LOW COMPLEX 30 MIN: CPT | Mod: GO

## 2023-10-25 PROCEDURE — L3935: CPT

## 2023-10-26 DIAGNOSIS — Z00.8 ENCOUNTER FOR OTHER GENERAL EXAMINATION: ICD-10-CM

## 2023-10-27 DIAGNOSIS — M65.342 TRIGGER FINGER, LEFT RING FINGER: ICD-10-CM

## 2023-10-27 DIAGNOSIS — M65.332 TRIGGER FINGER, LEFT MIDDLE FINGER: ICD-10-CM

## 2023-11-20 ENCOUNTER — APPOINTMENT (OUTPATIENT)
Dept: PLASTIC SURGERY | Facility: CLINIC | Age: 78
End: 2023-11-20
Payer: MEDICARE

## 2023-11-20 PROCEDURE — 99024 POSTOP FOLLOW-UP VISIT: CPT

## 2023-12-18 ENCOUNTER — APPOINTMENT (OUTPATIENT)
Dept: PLASTIC SURGERY | Facility: CLINIC | Age: 78
End: 2023-12-18

## 2024-02-13 ENCOUNTER — APPOINTMENT (OUTPATIENT)
Dept: CARDIOLOGY | Facility: CLINIC | Age: 79
End: 2024-02-13

## 2024-02-15 ENCOUNTER — APPOINTMENT (OUTPATIENT)
Dept: CARDIOLOGY | Facility: CLINIC | Age: 79
End: 2024-02-15
Payer: MEDICARE

## 2024-02-15 VITALS
SYSTOLIC BLOOD PRESSURE: 126 MMHG | HEART RATE: 66 BPM | DIASTOLIC BLOOD PRESSURE: 80 MMHG | WEIGHT: 223 LBS | BODY MASS INDEX: 32 KG/M2

## 2024-02-15 DIAGNOSIS — I25.10 ATHEROSCLEROTIC HEART DISEASE OF NATIVE CORONARY ARTERY W/OUT ANGINA PECTORIS: ICD-10-CM

## 2024-02-15 DIAGNOSIS — E78.5 HYPERLIPIDEMIA, UNSPECIFIED: ICD-10-CM

## 2024-02-15 DIAGNOSIS — I10 ESSENTIAL (PRIMARY) HYPERTENSION: ICD-10-CM

## 2024-02-15 PROCEDURE — 93000 ELECTROCARDIOGRAM COMPLETE: CPT

## 2024-02-15 PROCEDURE — 99214 OFFICE O/P EST MOD 30 MIN: CPT | Mod: 25

## 2024-02-15 NOTE — PHYSICAL EXAM
[General Appearance - Well Developed] : well developed [Normal Appearance] : normal appearance [Well Groomed] : well groomed [General Appearance - Well Nourished] : well nourished [No Deformities] : no deformities [General Appearance - In No Acute Distress] : no acute distress [Eyelids - No Xanthelasma] : the eyelids demonstrated no xanthelasmas [Normal Oral Mucosa] : normal oral mucosa [No Oral Pallor] : no oral pallor [No Oral Cyanosis] : no oral cyanosis [Normal Jugular Venous A Waves Present] : normal jugular venous A waves present [Normal Jugular Venous V Waves Present] : normal jugular venous V waves present [No Jugular Venous Skelton A Waves] : no jugular venous skelton A waves [Respiration, Rhythm And Depth] : normal respiratory rhythm and effort [Exaggerated Use Of Accessory Muscles For Inspiration] : no accessory muscle use [Auscultation Breath Sounds / Voice Sounds] : lungs were clear to auscultation bilaterally [Heart Rate And Rhythm] : heart rate and rhythm were normal [Heart Sounds] : normal S1 and S2 [Murmurs] : no murmurs present [Abdomen Soft] : soft [Abdomen Tenderness] : non-tender [Abdomen Mass (___ Cm)] : no abdominal mass palpated [Abnormal Walk] : normal gait [Gait - Sufficient For Exercise Testing] : the gait was sufficient for exercise testing [Nail Clubbing] : no clubbing of the fingernails [Cyanosis, Localized] : no localized cyanosis [Petechial Hemorrhages (___cm)] : no petechial hemorrhages [Skin Color & Pigmentation] : normal skin color and pigmentation [] : no rash [No Venous Stasis] : no venous stasis [Skin Lesions] : no skin lesions [No Skin Ulcers] : no skin ulcer [No Xanthoma] : no  xanthoma was observed [Affect] : the affect was normal [Oriented To Time, Place, And Person] : oriented to person, place, and time [Mood] : the mood was normal [No Anxiety] : not feeling anxious [Well Developed] : well developed [Well Nourished] : well nourished [No Acute Distress] : no acute distress [Normal Conjunctiva] : normal conjunctiva [Normal Venous Pressure] : normal venous pressure [No Carotid Bruit] : no carotid bruit [Normal S1, S2] : normal S1, S2 [No Murmur] : no murmur [No Rub] : no rub [No Gallop] : no gallop [Clear Lung Fields] : clear lung fields [Good Air Entry] : good air entry [No Respiratory Distress] : no respiratory distress  [Soft] : abdomen soft [Non Tender] : non-tender [No Masses/organomegaly] : no masses/organomegaly [Normal Bowel Sounds] : normal bowel sounds [Normal Gait] : normal gait [No Edema] : no edema [No Cyanosis] : no cyanosis [No Clubbing] : no clubbing [No Varicosities] : no varicosities [No Rash] : no rash [No Skin Lesions] : no skin lesions [Moves all extremities] : moves all extremities [No Focal Deficits] : no focal deficits [Normal Speech] : normal speech [Alert and Oriented] : alert and oriented [Normal memory] : normal memory

## 2024-08-15 ENCOUNTER — NON-APPOINTMENT (OUTPATIENT)
Age: 79
End: 2024-08-15

## 2024-08-15 ENCOUNTER — APPOINTMENT (OUTPATIENT)
Dept: CARDIOLOGY | Facility: CLINIC | Age: 79
End: 2024-08-15
Payer: MEDICARE

## 2024-08-15 VITALS
SYSTOLIC BLOOD PRESSURE: 122 MMHG | DIASTOLIC BLOOD PRESSURE: 72 MMHG | BODY MASS INDEX: 32.64 KG/M2 | WEIGHT: 228 LBS | HEIGHT: 70 IN | HEART RATE: 66 BPM

## 2024-08-15 VITALS — HEART RATE: 69 BPM

## 2024-08-15 DIAGNOSIS — I25.10 ATHEROSCLEROTIC HEART DISEASE OF NATIVE CORONARY ARTERY W/OUT ANGINA PECTORIS: ICD-10-CM

## 2024-08-15 PROCEDURE — 93000 ELECTROCARDIOGRAM COMPLETE: CPT

## 2024-08-15 PROCEDURE — 99214 OFFICE O/P EST MOD 30 MIN: CPT | Mod: 25

## 2024-08-15 NOTE — PHYSICAL EXAM
[General Appearance - Well Developed] : well developed [Normal Appearance] : normal appearance [Well Groomed] : well groomed [General Appearance - Well Nourished] : well nourished [No Deformities] : no deformities [General Appearance - In No Acute Distress] : no acute distress [Eyelids - No Xanthelasma] : the eyelids demonstrated no xanthelasmas [No Oral Pallor] : no oral pallor [Normal Oral Mucosa] : normal oral mucosa [No Oral Cyanosis] : no oral cyanosis [Normal Jugular Venous A Waves Present] : normal jugular venous A waves present [Normal Jugular Venous V Waves Present] : normal jugular venous V waves present [No Jugular Venous Skelton A Waves] : no jugular venous skelton A waves [Respiration, Rhythm And Depth] : normal respiratory rhythm and effort [Exaggerated Use Of Accessory Muscles For Inspiration] : no accessory muscle use [Auscultation Breath Sounds / Voice Sounds] : lungs were clear to auscultation bilaterally [Heart Sounds] : normal S1 and S2 [Heart Rate And Rhythm] : heart rate and rhythm were normal [Murmurs] : no murmurs present [Abdomen Soft] : soft [Abdomen Tenderness] : non-tender [Abdomen Mass (___ Cm)] : no abdominal mass palpated [Abnormal Walk] : normal gait [Gait - Sufficient For Exercise Testing] : the gait was sufficient for exercise testing [Nail Clubbing] : no clubbing of the fingernails [Cyanosis, Localized] : no localized cyanosis [Petechial Hemorrhages (___cm)] : no petechial hemorrhages [Skin Color & Pigmentation] : normal skin color and pigmentation [] : no rash [No Venous Stasis] : no venous stasis [Skin Lesions] : no skin lesions [No Skin Ulcers] : no skin ulcer [No Xanthoma] : no  xanthoma was observed [Oriented To Time, Place, And Person] : oriented to person, place, and time [Affect] : the affect was normal [Mood] : the mood was normal [No Anxiety] : not feeling anxious [Well Developed] : well developed [Well Nourished] : well nourished [No Acute Distress] : no acute distress [Normal Conjunctiva] : normal conjunctiva [Normal Venous Pressure] : normal venous pressure [Normal S1, S2] : normal S1, S2 [No Carotid Bruit] : no carotid bruit [No Murmur] : no murmur [No Rub] : no rub [No Gallop] : no gallop [Clear Lung Fields] : clear lung fields [Good Air Entry] : good air entry [No Respiratory Distress] : no respiratory distress  [Soft] : abdomen soft [Non Tender] : non-tender [No Masses/organomegaly] : no masses/organomegaly [Normal Bowel Sounds] : normal bowel sounds [Normal Gait] : normal gait [No Edema] : no edema [No Cyanosis] : no cyanosis [No Clubbing] : no clubbing [No Varicosities] : no varicosities [No Skin Lesions] : no skin lesions [No Rash] : no rash [Moves all extremities] : moves all extremities [No Focal Deficits] : no focal deficits [Normal Speech] : normal speech [Alert and Oriented] : alert and oriented [Normal memory] : normal memory

## 2024-12-11 ENCOUNTER — APPOINTMENT (OUTPATIENT)
Dept: CARDIOLOGY | Facility: CLINIC | Age: 79
End: 2024-12-11
Payer: MEDICARE

## 2024-12-11 VITALS
HEART RATE: 66 BPM | SYSTOLIC BLOOD PRESSURE: 123 MMHG | WEIGHT: 225 LBS | HEIGHT: 70 IN | DIASTOLIC BLOOD PRESSURE: 76 MMHG | BODY MASS INDEX: 32.21 KG/M2

## 2024-12-11 DIAGNOSIS — I25.10 ATHEROSCLEROTIC HEART DISEASE OF NATIVE CORONARY ARTERY W/OUT ANGINA PECTORIS: ICD-10-CM

## 2024-12-11 PROCEDURE — 99213 OFFICE O/P EST LOW 20 MIN: CPT | Mod: 25

## 2024-12-11 PROCEDURE — 93000 ELECTROCARDIOGRAM COMPLETE: CPT

## 2024-12-17 ENCOUNTER — OUTPATIENT (OUTPATIENT)
Dept: OUTPATIENT SERVICES | Facility: HOSPITAL | Age: 79
LOS: 1 days | End: 2024-12-17
Payer: MEDICARE

## 2024-12-17 ENCOUNTER — RESULT REVIEW (OUTPATIENT)
Age: 79
End: 2024-12-17

## 2024-12-17 DIAGNOSIS — S42.92XS FRACTURE OF LEFT SHOULDER GIRDLE, PART UNSPECIFIED, SEQUELA: Chronic | ICD-10-CM

## 2024-12-17 DIAGNOSIS — I25.10 ATHEROSCLEROTIC HEART DISEASE OF NATIVE CORONARY ARTERY WITHOUT ANGINA PECTORIS: Chronic | ICD-10-CM

## 2024-12-17 DIAGNOSIS — Z41.9 ENCOUNTER FOR PROCEDURE FOR PURPOSES OTHER THAN REMEDYING HEALTH STATE, UNSPECIFIED: Chronic | ICD-10-CM

## 2024-12-17 DIAGNOSIS — Z00.8 ENCOUNTER FOR OTHER GENERAL EXAMINATION: ICD-10-CM

## 2024-12-17 DIAGNOSIS — I25.10 ATHEROSCLEROTIC HEART DISEASE OF NATIVE CORONARY ARTERY WITHOUT ANGINA PECTORIS: ICD-10-CM

## 2024-12-17 PROCEDURE — 93018 CV STRESS TEST I&R ONLY: CPT

## 2024-12-17 PROCEDURE — 78452 HT MUSCLE IMAGE SPECT MULT: CPT

## 2024-12-17 PROCEDURE — 78452 HT MUSCLE IMAGE SPECT MULT: CPT | Mod: 26

## 2024-12-17 PROCEDURE — A9500: CPT

## 2024-12-18 DIAGNOSIS — I25.10 ATHEROSCLEROTIC HEART DISEASE OF NATIVE CORONARY ARTERY WITHOUT ANGINA PECTORIS: ICD-10-CM

## 2024-12-26 ENCOUNTER — APPOINTMENT (OUTPATIENT)
Dept: CARDIOLOGY | Facility: CLINIC | Age: 79
End: 2024-12-26
Payer: MEDICARE

## 2024-12-26 DIAGNOSIS — I25.10 ATHEROSCLEROTIC HEART DISEASE OF NATIVE CORONARY ARTERY W/OUT ANGINA PECTORIS: ICD-10-CM

## 2024-12-26 PROCEDURE — 93306 TTE W/DOPPLER COMPLETE: CPT

## 2025-02-05 ENCOUNTER — APPOINTMENT (OUTPATIENT)
Dept: CARDIOLOGY | Facility: CLINIC | Age: 80
End: 2025-02-05
Payer: MEDICARE

## 2025-02-05 VITALS
WEIGHT: 225 LBS | HEART RATE: 70 BPM | BODY MASS INDEX: 32.21 KG/M2 | DIASTOLIC BLOOD PRESSURE: 76 MMHG | SYSTOLIC BLOOD PRESSURE: 132 MMHG | HEIGHT: 70 IN

## 2025-02-05 DIAGNOSIS — R53.83 OTHER FATIGUE: ICD-10-CM

## 2025-02-05 DIAGNOSIS — E11.9 TYPE 2 DIABETES MELLITUS W/OUT COMPLICATIONS: ICD-10-CM

## 2025-02-05 DIAGNOSIS — E78.5 HYPERLIPIDEMIA, UNSPECIFIED: ICD-10-CM

## 2025-02-05 DIAGNOSIS — R06.09 OTHER FORMS OF DYSPNEA: ICD-10-CM

## 2025-02-05 DIAGNOSIS — I25.10 ATHEROSCLEROTIC HEART DISEASE OF NATIVE CORONARY ARTERY W/OUT ANGINA PECTORIS: ICD-10-CM

## 2025-02-05 PROCEDURE — 99204 OFFICE O/P NEW MOD 45 MIN: CPT

## 2025-02-07 PROBLEM — R53.83 FATIGUE: Status: ACTIVE | Noted: 2025-02-07

## 2025-02-07 PROBLEM — R06.09 DYSPNEA ON MINIMAL EXERTION: Status: ACTIVE | Noted: 2025-02-07

## 2025-02-10 VITALS — OXYGEN SATURATION: 95 % | SYSTOLIC BLOOD PRESSURE: 123 MMHG | HEART RATE: 64 BPM | DIASTOLIC BLOOD PRESSURE: 66 MMHG

## 2025-02-10 NOTE — H&P CARDIOLOGY - NSICDXPASTSURGICALHX_GEN_ALL_CORE_FT
PAST SURGICAL HISTORY:  Closed fracture of left shoulder, sequela Dislocation    Coronary arteriosclerosis after percutaneous transluminal coronary angioplasty (PTCA) 2 Stents 07/1/2014    Elective surgery Cholecystectomy 1 yr ago     PAST SURGICAL HISTORY:  Closed fracture of left shoulder, sequela Dislocation    Coronary arteriosclerosis after percutaneous transluminal coronary angioplasty (PTCA) 2 Stents 07/1/2014    Elective surgery Cholecystectomy 1 yr ago    H/O circumcision     History of cholecystectomy

## 2025-02-10 NOTE — H&P CARDIOLOGY - NSICDXPASTMEDICALHX_GEN_ALL_CORE_FT
PAST MEDICAL HISTORY:  CAD (coronary artery disease) PCI with 2 stents    DM (diabetes mellitus)     HTN (hypertension)     Hypercholesteremia      PAST MEDICAL HISTORY:  BPH (benign prostatic hyperplasia)     CAD (coronary artery disease) PCI with 2 stents    DM (diabetes mellitus)     HTN (hypertension)     Hypercholesteremia

## 2025-02-10 NOTE — H&P CARDIOLOGY - HISTORY OF PRESENT ILLNESS
Patient is a 80y Male PMH of CAD s/p, HTN, HLD, DM. Patient used to follow Dr Jimenez and went to establish care with new cardiologist Dr Johns.  He had a recent TTE and nuclear stress test due to dyspnea. He reports he is easily fatigued; this is triggered by climbing a flight of stairs. He notes the shortness of breath with exertion, occasional heaviness in the chest. He had a TTE which noted normal LV fx mild AV sclerosis and mild TR. Nuclear stress test with scar no ischemia  Patient presents to cardiology dept for [facility] with possible intervention    < from: NM Nuclear Stress Pharmacologic Multiple (12.17.24 @ 10:44) >    Impression:  1. There is a predominantly fixed perfusion defect of medium size and   moderate severity involving the lateral wall of the left ventricle. No   evidence of stress induced ischemia.  2. Normal left ventricular systolic function.      Cath 1/19/2021  Left Heart Catheterization:  LVEF%: 40  LVEDP: normal       LEFT HEART CATHETERIZATION                                    Left main normal     LAD: Prox 40-50% lesion, MId/distal mild disease                      Diag:  moderate disease, small     Left Circumflex: Prox patent stent, Distal patent stent , moderate disease, 50% lesion distal to stent  OM1 small patent  OM2: medium size 80% lesion     Right Coronary Artery: moderate disease Mid 30-40% lesion  RPDA patent       DOMINANCE: Right    ACCESS: Right radial  CLOSURE: D stat     INTERVENTION  PCI to OM2 with balloon angioplasty and STARR: SYNERGY 2.75 x 16 mm        Pre cath note:  indication:  [ ] STEMI                [ ] NSTEMI                 [ ] Acute coronary syndrome                   [ ]Unstable Angina   [ ] high risk  [ ] intermediate risk  [ ] low risk                   [x ] Stable Angina     non-invasive testing:     nst                     Date: 12/2024                    result: [ ] high risk  [x ] intermediate risk  [ ] low risk    Anti- Anginal medications:                    [ ] not used d/t                     [ ] used   (x ) BB     ( ) CCB      ( ) Nitrate   (  ) Ranexa          [ ] not used but strong indication not to use    Ejection Fraction                   [ ] <29            [ ] 30-39%   [ ] 40-49%     [x ]>50%    CHF          [ ] active (within last 14 days on meds   [ ] Chronic (on meds but no exacerbation)  NYHA Functional Class:  (  ) Class I (no limitations)  (  ) Class II (slight limitation)  (  ) Class III (marked limitation)  (  ) Class IV (symptoms at rest)    COPD                   [ ] mild (on chronic bronchodilators)  [ ] moderate (on chronic steroid therapy)      [ ] severe (indication for home O2 or PACO2 >50)    Other risk factors:                     [ ] Previous MI                     [ ] CVA/ stroke                    [ ] carotid stent/ CEA                    [ ] PVD/PAD- (arterial aneurysm, non-palpable pulses, tortuous vessel with inability to insert catheter, infra-renal dissection, renal or subclavian artery stenosis)                    [ ] previous CABG                    [ ] Renal Failure:  on HD  (  ) yes  (  ) no                    [x ] Diabetic  (  ) Type 1  ( x ) Type 2                                         (  ) Insulin dependent  (  ) non-insulin dependent                                         (x  ) Metformin  (  ) Januvia  (  ) Glimepiride  (  ) Glipizide  (  ) Glyburide  (  ) Actos                                         (  ) GLP-1 receptor agonists (Ozempic, Mounjaro, Wegovy, Zepbound, Trulicity, Byetta, Victoza)                                         (  ) SGLT2 Inhibitors (Farxiga, Jardiance, Invokana)                                         (  ) Other        Bleeding Risk:     Pre-cath Hydration: (  )  cc IV bolus x 1 over 1 hr followed by:    (  ) NS @ 75cc/hr until procedure (up to 2 hrs) if EF> 50%                                                                                                                             (  ) NS @ 50cc/hr until procedure (up to 2 hrs) if EF< 50%                                        (  ) No precath hydration d/t      RIGHT RADIAL ARTERY EVALUATION:  CAT TEST: [] Negative          [] Positive    EF: 12/2024  Date: 65-70%    EKG:   Date: Patient is a 80y Male PMH of CAD s/p PCI OM2 2021 Synergy 2.75x16 and patent pLCX and OM1 stents, BPH, HTN, HLD, DM. Patient used to follow Dr Jimenez and went to establish care with new cardiologist Dr Johns.  He had a recent TTE and nuclear stress test due to dyspnea. He reports he is easily fatigued; this is triggered by climbing a flight of stairs. He notes the shortness of breath with exertion, occasional heaviness in the chest. He had a TTE which noted normal LV fx mild AV sclerosis and mild TR. Nuclear stress test with scar no ischemia  Patient presents to cardiology dept for [facility] with possible intervention    < from: NM Nuclear Stress Pharmacologic Multiple (12.17.24 @ 10:44) >    Impression:  1. There is a predominantly fixed perfusion defect of medium size and   moderate severity involving the lateral wall of the left ventricle. No   evidence of stress induced ischemia.  2. Normal left ventricular systolic function.      Cath 1/19/2021  Left Heart Catheterization:  LVEF%: 40  LVEDP: normal       LEFT HEART CATHETERIZATION                                    Left main normal     LAD: Prox 40-50% lesion, MId/distal mild disease                      Diag:  moderate disease, small     Left Circumflex: Prox patent stent, Distal patent stent , moderate disease, 50% lesion distal to stent  OM1 small patent  OM2: medium size 80% lesion     Right Coronary Artery: moderate disease Mid 30-40% lesion  RPDA patent       DOMINANCE: Right    ACCESS: Right radial  CLOSURE: D stat     INTERVENTION  PCI to OM2 with balloon angioplasty and STARR: SYNERGY 2.75 x 16 mm        Pre cath note:  indication:  [ ] STEMI                [ ] NSTEMI                 [ ] Acute coronary syndrome                   [ ]Unstable Angina   [ ] high risk  [ ] intermediate risk  [ ] low risk                   [x ] Stable Angina     non-invasive testing:     nst                     Date: 12/2024                    result: [ ] high risk  [x ] intermediate risk  [ ] low risk    Anti- Anginal medications:                    [ ] not used d/t                     [ x] used (only 1 d/t )  (x ) BB     ( ) CCB      ( ) Nitrate   (  ) Ranexa          [ ] not used but strong indication not to use    Ejection Fraction                   [ ] <29            [ ] 30-39%   [ ] 40-49%     [x ]>50%    CHF          [ ] active (within last 14 days on meds   [ ] Chronic (on meds but no exacerbation)  NYHA Functional Class:  (  ) Class I (no limitations)  (  ) Class II (slight limitation)  (  ) Class III (marked limitation)  (  ) Class IV (symptoms at rest)    COPD                   [ ] mild (on chronic bronchodilators)  [ ] moderate (on chronic steroid therapy)      [ ] severe (indication for home O2 or PACO2 >50)    Other risk factors:                     [ ] Previous MI                     [ ] CVA/ stroke                    [ ] carotid stent/ CEA                    [ ] PVD/PAD- (arterial aneurysm, non-palpable pulses, tortuous vessel with inability to insert catheter, infra-renal dissection, renal or subclavian artery stenosis)                    [ ] previous CABG                    [ ] Renal Failure:  on HD  (  ) yes  (  ) no                    [x ] Diabetic  (  ) Type 1  ( x ) Type 2                                         (  ) Insulin dependent  (  ) non-insulin dependent                                         (x  ) Metformin  (  ) Januvia  (  ) Glimepiride  (  ) Glipizide  (  ) Glyburide  (  ) Actos                                         (  ) GLP-1 receptor agonists (Ozempic, Mounjaro, Wegovy, Zepbound, Trulicity, Byetta, Victoza)                                         (  ) SGLT2 Inhibitors (Farxiga, Jardiance, Invokana)                                         (  ) Other        Bleeding Risk: 0.8%    Pre-cath Hydration: ( x )  cc IV bolus x 1 over 1 hr followed by:    (x  ) NS @ 75cc/hr until procedure (up to 2 hrs) if EF> 50%                                                                                                                             (  ) NS @ 50cc/hr until procedure (up to 2 hrs) if EF< 50%                                        (  ) No precath hydration d/t      RIGHT RADIAL ARTERY EVALUATION:  CAT TEST: [] Negative          [x] Positive    EF: 12/2024  Date: 65-70%    EKG: SR  Date: 2/13

## 2025-02-13 ENCOUNTER — OUTPATIENT (OUTPATIENT)
Dept: OUTPATIENT SERVICES | Facility: HOSPITAL | Age: 80
LOS: 1 days | Discharge: HOME | End: 2025-02-13
Payer: MEDICARE

## 2025-02-13 ENCOUNTER — TRANSCRIPTION ENCOUNTER (OUTPATIENT)
Age: 80
End: 2025-02-13

## 2025-02-13 VITALS — SYSTOLIC BLOOD PRESSURE: 103 MMHG | HEART RATE: 50 BPM | DIASTOLIC BLOOD PRESSURE: 50 MMHG | RESPIRATION RATE: 16 BRPM

## 2025-02-13 DIAGNOSIS — S42.92XS FRACTURE OF LEFT SHOULDER GIRDLE, PART UNSPECIFIED, SEQUELA: Chronic | ICD-10-CM

## 2025-02-13 DIAGNOSIS — Z98.890 OTHER SPECIFIED POSTPROCEDURAL STATES: Chronic | ICD-10-CM

## 2025-02-13 DIAGNOSIS — I25.10 ATHEROSCLEROTIC HEART DISEASE OF NATIVE CORONARY ARTERY WITHOUT ANGINA PECTORIS: Chronic | ICD-10-CM

## 2025-02-13 DIAGNOSIS — Z41.9 ENCOUNTER FOR PROCEDURE FOR PURPOSES OTHER THAN REMEDYING HEALTH STATE, UNSPECIFIED: Chronic | ICD-10-CM

## 2025-02-13 DIAGNOSIS — I25.10 ATHEROSCLEROTIC HEART DISEASE OF NATIVE CORONARY ARTERY WITHOUT ANGINA PECTORIS: ICD-10-CM

## 2025-02-13 LAB
ANION GAP SERPL CALC-SCNC: 10 MMOL/L — SIGNIFICANT CHANGE UP (ref 7–14)
ANION GAP SERPL CALC-SCNC: 11 MMOL/L — SIGNIFICANT CHANGE UP (ref 7–14)
BUN SERPL-MCNC: 15 MG/DL — SIGNIFICANT CHANGE UP (ref 10–20)
BUN SERPL-MCNC: 17 MG/DL — SIGNIFICANT CHANGE UP (ref 10–20)
CALCIUM SERPL-MCNC: 8.2 MG/DL — LOW (ref 8.4–10.5)
CALCIUM SERPL-MCNC: 8.8 MG/DL — SIGNIFICANT CHANGE UP (ref 8.4–10.5)
CHLORIDE SERPL-SCNC: 101 MMOL/L — SIGNIFICANT CHANGE UP (ref 98–110)
CHLORIDE SERPL-SCNC: 99 MMOL/L — SIGNIFICANT CHANGE UP (ref 98–110)
CO2 SERPL-SCNC: 23 MMOL/L — SIGNIFICANT CHANGE UP (ref 17–32)
CO2 SERPL-SCNC: 25 MMOL/L — SIGNIFICANT CHANGE UP (ref 17–32)
CREAT SERPL-MCNC: 0.7 MG/DL — SIGNIFICANT CHANGE UP (ref 0.7–1.5)
CREAT SERPL-MCNC: 0.9 MG/DL — SIGNIFICANT CHANGE UP (ref 0.7–1.5)
EGFR: 87 ML/MIN/1.73M2 — SIGNIFICANT CHANGE UP
EGFR: 94 ML/MIN/1.73M2 — SIGNIFICANT CHANGE UP
GLUCOSE BLDC GLUCOMTR-MCNC: 135 MG/DL — HIGH (ref 70–99)
GLUCOSE SERPL-MCNC: 144 MG/DL — HIGH (ref 70–99)
GLUCOSE SERPL-MCNC: 145 MG/DL — HIGH (ref 70–99)
HCT VFR BLD CALC: 38.5 % — LOW (ref 42–52)
HCT VFR BLD CALC: 40.3 % — LOW (ref 42–52)
HGB BLD-MCNC: 12.8 G/DL — LOW (ref 14–18)
HGB BLD-MCNC: 13.4 G/DL — LOW (ref 14–18)
MCHC RBC-ENTMCNC: 30.1 PG — SIGNIFICANT CHANGE UP (ref 27–31)
MCHC RBC-ENTMCNC: 30.3 PG — SIGNIFICANT CHANGE UP (ref 27–31)
MCHC RBC-ENTMCNC: 33.2 G/DL — SIGNIFICANT CHANGE UP (ref 32–37)
MCHC RBC-ENTMCNC: 33.3 G/DL — SIGNIFICANT CHANGE UP (ref 32–37)
MCV RBC AUTO: 90.6 FL — SIGNIFICANT CHANGE UP (ref 80–94)
MCV RBC AUTO: 91.2 FL — SIGNIFICANT CHANGE UP (ref 80–94)
NRBC BLD AUTO-RTO: 0 /100 WBCS — SIGNIFICANT CHANGE UP (ref 0–0)
NRBC BLD AUTO-RTO: 0 /100 WBCS — SIGNIFICANT CHANGE UP (ref 0–0)
PLATELET # BLD AUTO: 249 K/UL — SIGNIFICANT CHANGE UP (ref 130–400)
PLATELET # BLD AUTO: 263 K/UL — SIGNIFICANT CHANGE UP (ref 130–400)
PMV BLD: 9.8 FL — SIGNIFICANT CHANGE UP (ref 7.4–10.4)
PMV BLD: 9.9 FL — SIGNIFICANT CHANGE UP (ref 7.4–10.4)
POTASSIUM SERPL-MCNC: 3.9 MMOL/L — SIGNIFICANT CHANGE UP (ref 3.5–5)
POTASSIUM SERPL-MCNC: 4.2 MMOL/L — SIGNIFICANT CHANGE UP (ref 3.5–5)
POTASSIUM SERPL-SCNC: 3.9 MMOL/L — SIGNIFICANT CHANGE UP (ref 3.5–5)
POTASSIUM SERPL-SCNC: 4.2 MMOL/L — SIGNIFICANT CHANGE UP (ref 3.5–5)
RBC # BLD: 4.22 M/UL — LOW (ref 4.7–6.1)
RBC # BLD: 4.45 M/UL — LOW (ref 4.7–6.1)
RBC # FLD: 13.3 % — SIGNIFICANT CHANGE UP (ref 11.5–14.5)
RBC # FLD: 13.5 % — SIGNIFICANT CHANGE UP (ref 11.5–14.5)
SODIUM SERPL-SCNC: 134 MMOL/L — LOW (ref 135–146)
SODIUM SERPL-SCNC: 135 MMOL/L — SIGNIFICANT CHANGE UP (ref 135–146)
WBC # BLD: 6.92 K/UL — SIGNIFICANT CHANGE UP (ref 4.8–10.8)
WBC # BLD: 7.04 K/UL — SIGNIFICANT CHANGE UP (ref 4.8–10.8)
WBC # FLD AUTO: 6.92 K/UL — SIGNIFICANT CHANGE UP (ref 4.8–10.8)
WBC # FLD AUTO: 7.04 K/UL — SIGNIFICANT CHANGE UP (ref 4.8–10.8)

## 2025-02-13 PROCEDURE — 85027 COMPLETE CBC AUTOMATED: CPT

## 2025-02-13 PROCEDURE — 93458 L HRT ARTERY/VENTRICLE ANGIO: CPT | Mod: 26,XU

## 2025-02-13 PROCEDURE — 93458 L HRT ARTERY/VENTRICLE ANGIO: CPT | Mod: 59

## 2025-02-13 PROCEDURE — 93010 ELECTROCARDIOGRAM REPORT: CPT

## 2025-02-13 PROCEDURE — 93005 ELECTROCARDIOGRAM TRACING: CPT

## 2025-02-13 PROCEDURE — C1894: CPT

## 2025-02-13 PROCEDURE — C1887: CPT

## 2025-02-13 PROCEDURE — C1769: CPT

## 2025-02-13 PROCEDURE — 36415 COLL VENOUS BLD VENIPUNCTURE: CPT

## 2025-02-13 PROCEDURE — C1725: CPT

## 2025-02-13 PROCEDURE — 82962 GLUCOSE BLOOD TEST: CPT

## 2025-02-13 PROCEDURE — 92920 PRQ TRLUML C ANGIOP 1ART&/BR: CPT | Mod: LC

## 2025-02-13 PROCEDURE — 80048 BASIC METABOLIC PNL TOTAL CA: CPT

## 2025-02-13 RX ORDER — BACTERIOSTATIC SODIUM CHLORIDE 0.9 %
1000 VIAL (ML) INJECTION
Refills: 0 | Status: DISCONTINUED | OUTPATIENT
Start: 2025-02-13 | End: 2025-02-13

## 2025-02-13 RX ORDER — ANTISEPTIC SURGICAL SCRUB 0.04 MG/ML
1 SOLUTION TOPICAL ONCE
Refills: 0 | Status: DISCONTINUED | OUTPATIENT
Start: 2025-02-13 | End: 2025-02-13

## 2025-02-13 RX ORDER — BACTERIOSTATIC SODIUM CHLORIDE 0.9 %
250 VIAL (ML) INJECTION ONCE
Refills: 0 | Status: DISCONTINUED | OUTPATIENT
Start: 2025-02-13 | End: 2025-02-13

## 2025-02-13 RX ADMIN — Medication 150 MILLILITER(S): at 15:04

## 2025-02-13 NOTE — CHART NOTE - NSCHARTNOTEFT_GEN_A_CORE
PRE-OP DIAGNOSIS: Unstable angina.     PROCEDURE:     [x] Coronary Angiogram   [x] LHC   [] LVG   [] RHC     PRIMARY PHYSICIAN:  Dr. Oliveros   FELLOW: Dr. Jonas      PROCEDURE DESCRIPTION:   Anesthesia: Local + Sedation     Access & Closure:   6-Fr Radial Artery => TR band     IV Contrast: 100 mL      ESTIMATED BLOOD LOSS: <10cc  SPECIMENS REMOVED: None    Intervention: PCI w/ balloon angioplasty and cutting balloon to 80% OM1 ISR   Implants: None         FINDINGS:   DOMINANCE: Right     LM: Minor luminal irregularities     LAD: 20% calcified proximal disease  D1: Minor luminal irregularities    CX: Patent prior stent in the proximal segment.  OM1: Small vessel. Minor luminal irregularities  OM2: Prior stent with 80% ISR. Patent prior distal stent    RCA: Mild proximal disease. 50% distal disease  RPDA: Mild disease      LVEDP:  10mmHg   EF:  55%     POST-OP DIAGNOSIS:  1-Vessel Coronary Artery Disease   80% OM2 ISR s/p PCI w/ balloon angioplasty and cutting balloon  Patent prior proximal LCX stebt        PLAN OF CARE:   [x] Post-procedure LVEDP-guided IV fluids: /hr x 6hrs  [x] Medications: ASA/Plavix. High-intensity statin.      DISPOSITION:  [x] D/C Home Today PRE-OP DIAGNOSIS: Unstable angina.     PROCEDURE:     [x] Coronary Angiogram   [x] LHC   [] LVG   [] RHC     PRIMARY PHYSICIAN:  Dr. Oliveros   FELLOW: Dr. Jonas      PROCEDURE DESCRIPTION:   Anesthesia: Local + Sedation     Access & Closure:   6-Fr Radial Artery => TR band     IV Contrast: 100 mL      ESTIMATED BLOOD LOSS: <10cc  SPECIMENS REMOVED: None    Intervention: PCI w/ balloon angioplasty and cutting balloon to 80% OM1 ISR   Implants: None         FINDINGS:   DOMINANCE: Right     LM: Minor luminal irregularities     LAD: 20% calcified proximal disease  D1: Minor luminal irregularities    CX: Patent prior stent in the proximal segment.  OM1: Small vessel. Minor luminal irregularities  OM2: Prior stent with 80% ISR. Patent prior distal stent    RCA: Mild proximal disease. 50% distal disease  RPDA: Mild disease      LVEDP:  10mmHg   EF:  55%     POST-OP DIAGNOSIS:  1-Vessel Coronary Artery Disease   80% OM2 ISR s/p PCI w/ balloon angioplasty and cutting balloon  Patent prior proximal LCX stent        PLAN OF CARE:   [x] Post-procedure LVEDP-guided IV fluids: /hr x 5 hrs  [x] Medications: ASA/Plavix. High-intensity statin.      DISPOSITION:  [x] D/C Home Today Consent 3/Introductory Paragraph: I gave the patient a chance to ask questions they had about the procedure.  Following this I explained the Mohs procedure and consent was obtained. The risks, benefits and alternatives to therapy were discussed in detail. Specifically, the risks of infection, scarring, bleeding, prolonged wound healing, incomplete removal, allergy to anesthesia, nerve injury and recurrence were addressed. Prior to the procedure, the treatment site was clearly identified and confirmed by the patient. All components of Universal Protocol/PAUSE Rule completed.

## 2025-02-13 NOTE — ASU PATIENT PROFILE, ADULT - AS SC BRADEN ACTIVITY
[FreeTextEntry1] : The patient was advised of the diagnosis. The natural history of the pathology was explained in full to the patient in layman's terms. All questions were answered. The risks and benefits of surgical and non-surgical treatment alternatives were explained in full to the patient.\par  (4) walks frequently

## 2025-02-13 NOTE — ASU DISCHARGE PLAN (ADULT/PEDIATRIC) - NS MD DC FALL RISK RISK
For information on Fall & Injury Prevention, visit: [email or web address] OR  [email or web address] OR  https://www.cdc.gov/steadi/patient.html

## 2025-02-13 NOTE — ASU DISCHARGE PLAN (ADULT/PEDIATRIC) - ASU DC SPECIAL INSTRUCTIONSFT
Discharge Instructions as follows:  - Continue medical regimen as prescribed to prevent chest pain.  - If you are diabetic and taking medication containing Metformin, do not take them for 48 hours after the procedure  - Continue dual anti-platelet therapy, beta blocker, Statin   - Instructed to call 911 if chest pain, shortness of breath or bleeding from access site.  - No heavy lifting > 10lbs x 1 week.  - No driving x 24 hours.  - No baths, swimming pools x 1 week, may shower  - Low sodium low fat low cholesterol diet  - Follow-up with Cardiologist in 1-2 weeks after discharge  - Soreness or tenderness at the site is possible, it will diminish over time. You may take Tylenol every 4-6 hours as needed. Nothing stronger is needed. NO Motrin/Ibuprofen  - Any questions call cardiac cath lab [phone]

## 2025-02-13 NOTE — ASU DISCHARGE PLAN (ADULT/PEDIATRIC) - CARE PROVIDER_API CALL
Luis Daniel Johns.  Interventional Cardiology  [address] [place] [id number]  Phone: [phone]  Fax: [phone]  Follow Up Time:

## 2025-02-13 NOTE — ASU PREOP CHECKLIST - ALLERGY BAND ON
"Anesthesia Transfer of Care Note    Patient: Chitra Marie    Procedure(s) Performed: Procedure(s) (LRB):  RELEASE, HAND, FOR DEQUERVAIN'S TENOSYNOVITIS - RIGHT (Right)    Patient location: PACU    Anesthesia Type: general    Transport from OR: Transported from OR on room air with adequate spontaneous ventilation    Post pain: adequate analgesia    Post assessment: no apparent anesthetic complications    Post vital signs: stable    Level of consciousness: awake    Nausea/Vomiting: no nausea/vomiting    Complications: none    Transfer of care protocol was followed      Last vitals:   Visit Vitals  /68 (BP Location: Left arm, Patient Position: Lying)   Pulse 66   Temp 36.4 °C (97.6 °F) (Oral)   Resp 16   Ht 5' 6" (1.676 m)   Wt 95.3 kg (210 lb)   SpO2 100%   Breastfeeding No   BMI 33.89 kg/m²     " no known allergies

## 2025-02-13 NOTE — ASU DISCHARGE PLAN (ADULT/PEDIATRIC) - FINANCIAL ASSISTANCE
[facility] provides services at a reduced cost to those who are determined to be eligible through [facility]’s financial assistance program. Information regarding [facility]’s financial assistance program can be found by going to [email or web address] or by calling [phone].

## 2025-02-13 NOTE — PROGRESS NOTE ADULT - SUBJECTIVE AND OBJECTIVE BOX
Cardiology Follow up    GAMAL MEDELLIN   80y Male  PAST MEDICAL & SURGICAL HISTORY:  CAD (coronary artery disease)  PCI with 2 stents    HTN (hypertension)    Hypercholesteremia    DM (diabetes mellitus)    BPH (benign prostatic hyperplasia)    Coronary arteriosclerosis after percutaneous transluminal coronary angioplasty (PTCA)  2 Stents 07/1/2014    Elective surgery  Cholecystectomy 1 yr ago    Closed fracture of left shoulder, sequela  Dislocation    History of cholecystectomy    H/O circumcision           HPI:  Patient is a 80y Male PMH of CAD s/p PCI OM2 2021 Synergy 2.75x16 and patent pLCX and OM1 stents, BPH, HTN, HLD, DM. Patient used to follow Dr Jimenez and went to establish care with new cardiologist Dr Johns.  He had a recent TTE and nuclear stress test due to dyspnea. He reports he is easily fatigued; this is triggered by climbing a flight of stairs. He notes the shortness of breath with exertion, occasional heaviness in the chest. He had a TTE which noted normal LV fx mild AV sclerosis and mild TR. Nuclear stress test with scar no ischemia  Patient presents to cardiology dept for [facility] with possible intervention    Allergies    No Known Allergies    Intolerances      Patient seen and examined at bedside. No acute events.  Patient without complaints. Pt ambulated without issues/symptoms  Denies CP, SOB, palpitations, or dizziness  No events on telemetry     Vital Signs Last 24 Hrs    HR: 54   BP: 107/67  RR: 16  SpO2: 95% on RA            REVIEW OF SYSTEMS:          All negative except as mentioned in HPI    PHYSICAL EXAM:           CONSTITUTIONAL: Well-developed; well-nourished; in no acute distress  	SKIN: warm, dry  	HEAD: Normocephalic; atraumatic  	EYES: PERRL.  	ENT: No nasal discharge, airway clear, mucous membranes moist  	NECK: Supple; non tender.  	CARD: +S1, +S2, no murmurs, gallops, or rubs. Regular rate and rhythm    	RESP: No wheezes, rales or rhonchi. CTA B/L  	ABD: soft ntnd, + BS x 4 quadrants  	EXT: moves all extremities,  no clubbing, cyanosis or edema  	NEURO: Alert and oriented x3, no focal deficits          PSYCH: Cooperative, appropriate          VASCULAR:  + Rad / + PTs / +  DPs          EXTREMITY:               	   Right Radial: Dressing D/C/I, D stat in place , access site soft, no hematoma, no pain, + pulses, no sign of infection, no numbness            ECG:   PENDING 1400  CBC Pending 1400      LABS:                        13.4   7.04  )-----------( 263      ( 13 Feb 2025 10:00 )             40.3     02-13    134[L]  |  99  |  17  ----------------------------<  144[H]  4.2   |  25  |  0.9    Ca    8.8      13 Feb 2025 10:00              A/P:  I discussed the case with Cardiologist Dr. Oliveros   and recommend the following:    S/P PCI:      Intervention: PCI w/ balloon angioplasty and cutting balloon to 80% OM1 ISR   Implants: None       FINDINGS:   DOMINANCE: Right     LM: Minor luminal irregularities     LAD: 20% calcified proximal disease  D1: Minor luminal irregularities    CX: Patent prior stent in the proximal segment.  OM1: Small vessel. Minor luminal irregularities  OM2: Prior stent with 80% ISR. Patent prior distal stent    RCA: Mild proximal disease. 50% distal disease  RPDA: Mild disease      LVEDP:  10mmHg   EF:  55%     POST-OP DIAGNOSIS:  1-Vessel Coronary Artery Disease   80% OM2 ISR s/p PCI w/ balloon angioplasty and cutting balloon  Patent prior proximal LCX stebt                     Continue DAPT ( Aspirin 81 mg PO Daily and Plavix 75mg po daily   ),  B-Blocker, Statin Therapy                   No ACEi/ARB at this time due to borderline BP.  Will eval for need on follow up                    Patient given 30 day supply of ( Aspirin 81 mg daily and Plavix 75 mg daily ) to take at home                   CBC @1400                   EKG prior to d/c @ 1400                   NS @ 150 ml/hr x 6hrs                   Ambulate with assistance                   Monitor access site/ distal pulses                   Patient agreeing to take DAPT for at least one year or as directed by cardiologist                    Pt given instructions on importance of taking antiplatelet medication or risk acute stent thrombosis/death                   Post cath instructions, access site care and activity restrictions reviewed with patient                     Discussed with patient to return to hospital if experience chest pain, shortness breath, dizziness and site bleeding                   Aggressive risk factor modification, diet counseling, smoking cessation discussed with patient                    Benefits of cardiac rehab discussed with patient                    Cardiac rehab info provided/referral and communication to cardiac rehab completed                   Patient instructed to call cardiac rehab and make initial appointment after first follow-up visit with Cardiologist                    Can discharge patient @1600 from cardiac standpoint after ambulating without symptoms, access site wnl, labs and ECG reviewed                    Follow up with Cardiology Dr. Johns  in two weeks.  Instructed to call and make an appointment                      Cardiology Follow up    GAMAL MEDELLIN   80y Male  PAST MEDICAL & SURGICAL HISTORY:  CAD (coronary artery disease)  PCI with 2 stents    HTN (hypertension)    Hypercholesteremia    DM (diabetes mellitus)    BPH (benign prostatic hyperplasia)    Coronary arteriosclerosis after percutaneous transluminal coronary angioplasty (PTCA)  2 Stents 07/1/2014    Elective surgery  Cholecystectomy 1 yr ago    Closed fracture of left shoulder, sequela  Dislocation    History of cholecystectomy    H/O circumcision           HPI:  Patient is a 80y Male PMH of CAD s/p PCI OM2 2021 Synergy 2.75x16 and patent pLCX and OM1 stents, BPH, HTN, HLD, DM. Patient used to follow Dr Jimenez and went to establish care with new cardiologist Dr Johns.  He had a recent TTE and nuclear stress test due to dyspnea. He reports he is easily fatigued; this is triggered by climbing a flight of stairs. He notes the shortness of breath with exertion, occasional heaviness in the chest. He had a TTE which noted normal LV fx mild AV sclerosis and mild TR. Nuclear stress test with scar no ischemia  Patient presents to cardiology dept for [facility] with possible intervention    Allergies    No Known Allergies    Intolerances      Patient seen and examined at bedside. No acute events.  Patient without complaints. Pt ambulated without issues/symptoms  Denies CP, SOB, palpitations, or dizziness  No events on telemetry     Vital Signs Last 24 Hrs    HR: 54   BP: 107/67  RR: 16  SpO2: 95% on RA            REVIEW OF SYSTEMS:          All negative except as mentioned in HPI    PHYSICAL EXAM:           CONSTITUTIONAL: Well-developed; well-nourished; in no acute distress  	SKIN: warm, dry  	HEAD: Normocephalic; atraumatic  	EYES: PERRL.  	ENT: No nasal discharge, airway clear, mucous membranes moist  	NECK: Supple; non tender.  	CARD: +S1, +S2, no murmurs, gallops, or rubs. Regular rate and rhythm    	RESP: No wheezes, rales or rhonchi. CTA B/L  	ABD: soft ntnd, + BS x 4 quadrants  	EXT: moves all extremities,  no clubbing, cyanosis or edema  	NEURO: Alert and oriented x3, no focal deficits          PSYCH: Cooperative, appropriate          VASCULAR:  + Rad / + PTs / +  DPs          EXTREMITY:               	   Right Radial: Dressing D/C/I, D stat in place , access site soft, no hematoma, no pain, + pulses, no sign of infection, no numbness            ECG:   PENDING 1400  CBC Pending 1400      LABS:                        13.4   7.04  )-----------( 263      ( 13 Feb 2025 10:00 )             40.3     02-13    134[L]  |  99  |  17  ----------------------------<  144[H]  4.2   |  25  |  0.9    Ca    8.8      13 Feb 2025 10:00              A/P:  I discussed the case with Cardiologist Dr. Oliveros   and recommend the following:    S/P PCI:      Intervention: PCI w/ balloon angioplasty and cutting balloon to 80% OM1 ISR   Implants: None       FINDINGS:   DOMINANCE: Right     LM: Minor luminal irregularities     LAD: 20% calcified proximal disease  D1: Minor luminal irregularities    CX: Patent prior stent in the proximal segment.  OM1: Small vessel. Minor luminal irregularities  OM2: Prior stent with 80% ISR. Patent prior distal stent    RCA: Mild proximal disease. 50% distal disease  RPDA: Mild disease      LVEDP:  10mmHg   EF:  55%     POST-OP DIAGNOSIS:  1-Vessel Coronary Artery Disease   80% OM2 ISR s/p PCI w/ balloon angioplasty and cutting balloon  Patent prior proximal LCX stebt                     Continue DAPT ( Aspirin 81 mg PO Daily and Plavix 75mg po daily   ),  B-Blocker, Statin Therapy                   No ACEi/ARB at this time due to borderline BP.  Will eval for need on follow up                    Patient given 30 day supply of ( Aspirin 81 mg daily and Plavix 75 mg daily ) to take at home                   CBC @1400                   EKG prior to d/c @ 1400                   NS @ 150 ml/hr x 6hrs                   Ambulate with assistance                   Monitor access site/ distal pulses                   Patient agreeing to take DAPT for at least one year or as directed by cardiologist                    Pt given instructions on importance of taking antiplatelet medication or risk acute stent thrombosis/death                   Post cath instructions, access site care and activity restrictions reviewed with patient                     Discussed with patient to return to hospital if experience chest pain, shortness breath, dizziness and site bleeding                   Aggressive risk factor modification, diet counseling, smoking cessation discussed with patient                    Benefits of cardiac rehab discussed with patient                    Cardiac rehab info provided/referral and communication to cardiac rehab completed                   Patient instructed to call cardiac rehab and make initial appointment after first follow-up visit with Cardiologist                    Can discharge patient @1600 from cardiac standpoint after ambulating without symptoms, access site wnl, labs and ECG reviewed                    Follow up with Cardiology Dr. Johns  in two weeks.  Instructed to call and make an appointment                     - Labs and EKG reviewed pt clear for D/C at 1600

## 2025-02-13 NOTE — ASU PATIENT PROFILE, ADULT - MEDICATION HERBAL REMEDIES, PROFILE
c/oi chest pressure and SOB for 3 weeks, reports was admitted to another hospital and left AMA yesterday, was diginose with blood clots in B/l  lower extremities./ pxh of DM, HIV. pt with tremorts, able to communicate with full and complete sentences, however, visibly becomes dyspneic at the end of a sentence. no

## 2025-02-18 DIAGNOSIS — I10 ESSENTIAL (PRIMARY) HYPERTENSION: ICD-10-CM

## 2025-02-18 DIAGNOSIS — I25.118 ATHEROSCLEROTIC HEART DISEASE OF NATIVE CORONARY ARTERY WITH OTHER FORMS OF ANGINA PECTORIS: ICD-10-CM

## 2025-02-18 DIAGNOSIS — E78.5 HYPERLIPIDEMIA, UNSPECIFIED: ICD-10-CM

## 2025-02-18 DIAGNOSIS — Z95.5 PRESENCE OF CORONARY ANGIOPLASTY IMPLANT AND GRAFT: ICD-10-CM

## 2025-02-18 DIAGNOSIS — R94.39 ABNORMAL RESULT OF OTHER CARDIOVASCULAR FUNCTION STUDY: ICD-10-CM

## 2025-02-20 ENCOUNTER — APPOINTMENT (OUTPATIENT)
Dept: CARDIOLOGY | Facility: CLINIC | Age: 80
End: 2025-02-20

## 2025-04-21 PROBLEM — N40.0 BENIGN PROSTATIC HYPERPLASIA WITHOUT LOWER URINARY TRACT SYMPTOMS: Chronic | Status: ACTIVE | Noted: 2025-02-13

## 2025-06-02 ENCOUNTER — APPOINTMENT (OUTPATIENT)
Dept: CARDIOLOGY | Facility: CLINIC | Age: 80
End: 2025-06-02
Payer: MEDICARE

## 2025-06-02 VITALS
BODY MASS INDEX: 32.64 KG/M2 | HEART RATE: 63 BPM | WEIGHT: 228 LBS | SYSTOLIC BLOOD PRESSURE: 152 MMHG | HEIGHT: 70 IN | DIASTOLIC BLOOD PRESSURE: 92 MMHG

## 2025-06-02 DIAGNOSIS — R53.83 OTHER FATIGUE: ICD-10-CM

## 2025-06-02 DIAGNOSIS — E11.9 TYPE 2 DIABETES MELLITUS W/OUT COMPLICATIONS: ICD-10-CM

## 2025-06-02 DIAGNOSIS — I25.10 ATHEROSCLEROTIC HEART DISEASE OF NATIVE CORONARY ARTERY W/OUT ANGINA PECTORIS: ICD-10-CM

## 2025-06-02 DIAGNOSIS — E78.5 HYPERLIPIDEMIA, UNSPECIFIED: ICD-10-CM

## 2025-06-02 PROCEDURE — 99214 OFFICE O/P EST MOD 30 MIN: CPT

## 2025-06-02 RX ORDER — ROSUVASTATIN CALCIUM 40 MG/1
40 TABLET, FILM COATED ORAL
Qty: 90 | Refills: 1 | Status: ACTIVE | COMMUNITY
Start: 2025-06-02 | End: 1900-01-01

## 2025-06-02 RX ORDER — REPAGLINIDE 0.5 MG/1
0.5 TABLET ORAL
Qty: 180 | Refills: 0 | Status: ACTIVE | COMMUNITY
Start: 2025-05-25

## 2025-06-02 RX ORDER — ATORVASTATIN CALCIUM 40 MG/1
40 TABLET, FILM COATED ORAL
Qty: 90 | Refills: 0 | Status: DISCONTINUED | COMMUNITY
Start: 2024-09-11

## 2025-06-02 RX ORDER — CLOPIDOGREL BISULFATE 75 MG/1
75 TABLET, FILM COATED ORAL
Qty: 30 | Refills: 6 | Status: ACTIVE | COMMUNITY
Start: 2025-04-21 | End: 1900-01-01

## 2025-06-02 RX ORDER — RANOLAZINE 500 MG/1
500 TABLET, FILM COATED, EXTENDED RELEASE ORAL
Qty: 60 | Refills: 0 | Status: ACTIVE | COMMUNITY
Start: 2025-06-02 | End: 1900-01-01

## 2025-06-14 ENCOUNTER — APPOINTMENT (OUTPATIENT)
Dept: SLEEP CENTER | Facility: HOSPITAL | Age: 80
End: 2025-06-14

## 2025-06-14 ENCOUNTER — OUTPATIENT (OUTPATIENT)
Dept: OUTPATIENT SERVICES | Facility: HOSPITAL | Age: 80
LOS: 1 days | Discharge: HOME | End: 2025-06-14
Payer: MEDICARE

## 2025-06-14 DIAGNOSIS — S42.92XS FRACTURE OF LEFT SHOULDER GIRDLE, PART UNSPECIFIED, SEQUELA: Chronic | ICD-10-CM

## 2025-06-14 DIAGNOSIS — Z41.9 ENCOUNTER FOR PROCEDURE FOR PURPOSES OTHER THAN REMEDYING HEALTH STATE, UNSPECIFIED: Chronic | ICD-10-CM

## 2025-06-14 DIAGNOSIS — I25.10 ATHEROSCLEROTIC HEART DISEASE OF NATIVE CORONARY ARTERY WITHOUT ANGINA PECTORIS: Chronic | ICD-10-CM

## 2025-06-14 DIAGNOSIS — Z98.890 OTHER SPECIFIED POSTPROCEDURAL STATES: Chronic | ICD-10-CM

## 2025-06-14 PROCEDURE — 95800 SLP STDY UNATTENDED: CPT | Mod: 26

## 2025-06-14 PROCEDURE — 95800 SLP STDY UNATTENDED: CPT

## 2025-06-17 ENCOUNTER — APPOINTMENT (OUTPATIENT)
Dept: CARDIOLOGY | Facility: CLINIC | Age: 80
End: 2025-06-17
Payer: MEDICARE

## 2025-06-17 PROCEDURE — 99212 OFFICE O/P EST SF 10 MIN: CPT | Mod: 93

## 2025-06-18 DIAGNOSIS — G47.33 OBSTRUCTIVE SLEEP APNEA (ADULT) (PEDIATRIC): ICD-10-CM

## 2025-06-19 DIAGNOSIS — G47.33 OBSTRUCTIVE SLEEP APNEA (ADULT) (PEDIATRIC): ICD-10-CM

## 2025-06-20 ENCOUNTER — NON-APPOINTMENT (OUTPATIENT)
Age: 80
End: 2025-06-20

## 2025-09-16 ENCOUNTER — APPOINTMENT (OUTPATIENT)
Dept: CARDIOLOGY | Facility: CLINIC | Age: 80
End: 2025-09-16